# Patient Record
Sex: MALE | Race: WHITE | Employment: FULL TIME | URBAN - METROPOLITAN AREA
[De-identification: names, ages, dates, MRNs, and addresses within clinical notes are randomized per-mention and may not be internally consistent; named-entity substitution may affect disease eponyms.]

---

## 2017-03-02 ENCOUNTER — OFFICE VISIT (OUTPATIENT)
Dept: ORTHOPEDIC SURGERY | Age: 52
End: 2017-03-02

## 2017-03-02 VITALS
WEIGHT: 248 LBS | HEIGHT: 69 IN | SYSTOLIC BLOOD PRESSURE: 136 MMHG | DIASTOLIC BLOOD PRESSURE: 87 MMHG | HEART RATE: 88 BPM | BODY MASS INDEX: 36.73 KG/M2

## 2017-03-02 DIAGNOSIS — M19.011 ACROMIOCLAVICULAR ARTHROSIS, BILATERAL: ICD-10-CM

## 2017-03-02 DIAGNOSIS — M75.102 ROTATOR CUFF SYNDROME, LEFT: Primary | ICD-10-CM

## 2017-03-02 DIAGNOSIS — M19.012 ACROMIOCLAVICULAR ARTHROSIS, BILATERAL: ICD-10-CM

## 2017-03-02 DIAGNOSIS — M75.22 BICEPS TENDINITIS OF BOTH SHOULDERS: ICD-10-CM

## 2017-03-02 DIAGNOSIS — M75.21 BICEPS TENDINITIS OF BOTH SHOULDERS: ICD-10-CM

## 2017-03-02 PROCEDURE — 20610 DRAIN/INJ JOINT/BURSA W/O US: CPT | Performed by: ORTHOPAEDIC SURGERY

## 2017-03-02 PROCEDURE — 73030 X-RAY EXAM OF SHOULDER: CPT | Performed by: ORTHOPAEDIC SURGERY

## 2017-03-02 PROCEDURE — 99243 OFF/OP CNSLTJ NEW/EST LOW 30: CPT | Performed by: ORTHOPAEDIC SURGERY

## 2017-03-02 RX ORDER — HYDROCODONE BITARTRATE AND ACETAMINOPHEN 5; 325 MG/1; MG/1
TABLET ORAL
Refills: 0 | COMMUNITY
Start: 2017-02-01 | End: 2018-12-20 | Stop reason: ALTCHOICE

## 2017-03-02 ASSESSMENT — ENCOUNTER SYMPTOMS: BACK PAIN: 1

## 2017-10-11 ENCOUNTER — TELEPHONE (OUTPATIENT)
Dept: FAMILY MEDICINE CLINIC | Age: 52
End: 2017-10-11

## 2017-10-11 DIAGNOSIS — Z12.5 SCREENING PSA (PROSTATE SPECIFIC ANTIGEN): ICD-10-CM

## 2017-10-11 DIAGNOSIS — Z00.00 ROUTINE GENERAL MEDICAL EXAMINATION AT A HEALTH CARE FACILITY: ICD-10-CM

## 2017-10-11 DIAGNOSIS — Z11.4 ENCOUNTER FOR SCREENING FOR HIV: ICD-10-CM

## 2017-10-11 DIAGNOSIS — Z13.220 SCREENING, LIPID: Primary | ICD-10-CM

## 2017-10-11 NOTE — TELEPHONE ENCOUNTER
Jaxson Schooling -wife (currently your patient) would like to know if you would accept pt as a NP?   Pl advise  [phone]

## 2017-10-24 NOTE — TELEPHONE ENCOUNTER
Labs ordered, [facility] aware 12 hr fast also to have done at least 3 days prior to appt. Scheduled 11/13.

## 2017-11-28 ENCOUNTER — TELEPHONE (OUTPATIENT)
Dept: FAMILY MEDICINE CLINIC | Age: 52
End: 2017-11-28

## 2017-11-28 NOTE — TELEPHONE ENCOUNTER
Pt was scheduled as a NP on Nov. 30, and he needs to cancel b/c he will be out of town. Pt is wanting to reschedule for sometime in Jan. And would like to know if that would be ok?   Pl advise Aldo Ennis wife @ [phone]

## 2018-02-27 DIAGNOSIS — Z13.220 SCREENING, LIPID: ICD-10-CM

## 2018-02-27 DIAGNOSIS — Z11.4 ENCOUNTER FOR SCREENING FOR HIV: ICD-10-CM

## 2018-02-27 DIAGNOSIS — Z00.00 ROUTINE GENERAL MEDICAL EXAMINATION AT A HEALTH CARE FACILITY: ICD-10-CM

## 2018-02-27 DIAGNOSIS — Z12.5 SCREENING PSA (PROSTATE SPECIFIC ANTIGEN): ICD-10-CM

## 2018-02-27 LAB
A/G RATIO: 1.9 (ref 1.1–2.2)
ALBUMIN SERPL-MCNC: 4.6 G/DL (ref 3.4–5)
ALP BLD-CCNC: 126 U/L (ref 40–129)
ALT SERPL-CCNC: 20 U/L (ref 10–40)
ANION GAP SERPL CALCULATED.3IONS-SCNC: 10 MMOL/L (ref 3–16)
AST SERPL-CCNC: 17 U/L (ref 15–37)
BASOPHILS ABSOLUTE: 0 K/UL (ref 0–0.2)
BASOPHILS RELATIVE PERCENT: 0.5 %
BILIRUB SERPL-MCNC: 0.3 MG/DL (ref 0–1)
BUN BLDV-MCNC: 12 MG/DL (ref 7–20)
CALCIUM SERPL-MCNC: 9.3 MG/DL (ref 8.3–10.6)
CHLORIDE BLD-SCNC: 102 MMOL/L (ref 99–110)
CHOLESTEROL, TOTAL: 194 MG/DL (ref 0–199)
CO2: 29 MMOL/L (ref 21–32)
CREAT SERPL-MCNC: 0.8 MG/DL (ref 0.9–1.3)
EOSINOPHILS ABSOLUTE: 0.5 K/UL (ref 0–0.6)
EOSINOPHILS RELATIVE PERCENT: 5.2 %
GFR AFRICAN AMERICAN: >60
GFR NON-AFRICAN AMERICAN: >60
GLOBULIN: 2.4 G/DL
GLUCOSE BLD-MCNC: 100 MG/DL (ref 70–99)
HCT VFR BLD CALC: 45.4 % (ref 40.5–52.5)
HDLC SERPL-MCNC: 31 MG/DL (ref 40–60)
HEMOGLOBIN: 15.7 G/DL (ref 13.5–17.5)
LDL CHOLESTEROL CALCULATED: 130 MG/DL
LYMPHOCYTES ABSOLUTE: 2.6 K/UL (ref 1–5.1)
LYMPHOCYTES RELATIVE PERCENT: 28.8 %
MCH RBC QN AUTO: 30.6 PG (ref 26–34)
MCHC RBC AUTO-ENTMCNC: 34.6 G/DL (ref 31–36)
MCV RBC AUTO: 88.3 FL (ref 80–100)
MONOCYTES ABSOLUTE: 0.7 K/UL (ref 0–1.3)
MONOCYTES RELATIVE PERCENT: 7.3 %
NEUTROPHILS ABSOLUTE: 5.3 K/UL (ref 1.7–7.7)
NEUTROPHILS RELATIVE PERCENT: 58.2 %
PDW BLD-RTO: 14.1 % (ref 12.4–15.4)
PLATELET # BLD: 308 K/UL (ref 135–450)
PMV BLD AUTO: 7.5 FL (ref 5–10.5)
POTASSIUM SERPL-SCNC: 4.7 MMOL/L (ref 3.5–5.1)
PROSTATE SPECIFIC ANTIGEN: 0.6 NG/ML (ref 0–4)
RBC # BLD: 5.14 M/UL (ref 4.2–5.9)
SODIUM BLD-SCNC: 141 MMOL/L (ref 136–145)
TOTAL PROTEIN: 7 G/DL (ref 6.4–8.2)
TRIGL SERPL-MCNC: 166 MG/DL (ref 0–150)
VLDLC SERPL CALC-MCNC: 33 MG/DL
WBC # BLD: 9 K/UL (ref 4–11)

## 2018-02-28 LAB
HIV AG/AB: NORMAL
HIV ANTIGEN: NORMAL
HIV-1 ANTIBODY: NORMAL
HIV-2 AB: NORMAL

## 2018-12-10 ENCOUNTER — TELEPHONE (OUTPATIENT)
Dept: FAMILY MEDICINE CLINIC | Age: 53
End: 2018-12-10

## 2018-12-20 ENCOUNTER — HOSPITAL ENCOUNTER (OUTPATIENT)
Dept: CT IMAGING | Age: 53
Discharge: HOME | End: 2018-12-20
Payer: COMMERCIAL

## 2018-12-20 ENCOUNTER — OFFICE VISIT (OUTPATIENT)
Dept: FAMILY MEDICINE CLINIC | Age: 53
End: 2018-12-20
Payer: COMMERCIAL

## 2018-12-20 ENCOUNTER — HOSPITAL ENCOUNTER (OUTPATIENT)
Age: 53
Discharge: HOME | End: 2018-12-20
Payer: COMMERCIAL

## 2018-12-20 VITALS
DIASTOLIC BLOOD PRESSURE: 84 MMHG | TEMPERATURE: 97.9 F | WEIGHT: 260 LBS | BODY MASS INDEX: 38.51 KG/M2 | HEIGHT: 69 IN | SYSTOLIC BLOOD PRESSURE: 122 MMHG

## 2018-12-20 DIAGNOSIS — Z72.0 NICOTINE ABUSE: ICD-10-CM

## 2018-12-20 DIAGNOSIS — Z87.19 HISTORY OF PANCREATITIS: ICD-10-CM

## 2018-12-20 DIAGNOSIS — Z00.00 PREVENTATIVE HEALTH CARE: Primary | ICD-10-CM

## 2018-12-20 DIAGNOSIS — R14.0 ABDOMINAL BLOATING: ICD-10-CM

## 2018-12-20 DIAGNOSIS — Z12.5 PROSTATE CANCER SCREENING: ICD-10-CM

## 2018-12-20 DIAGNOSIS — R35.0 URINARY FREQUENCY: ICD-10-CM

## 2018-12-20 DIAGNOSIS — G89.29 CHRONIC RIGHT-SIDED LOW BACK PAIN WITH RIGHT-SIDED SCIATICA: ICD-10-CM

## 2018-12-20 DIAGNOSIS — M54.41 CHRONIC RIGHT-SIDED LOW BACK PAIN WITH RIGHT-SIDED SCIATICA: ICD-10-CM

## 2018-12-20 LAB
A/G RATIO: 1.9 (ref 1.1–2.2)
ALBUMIN SERPL-MCNC: 4.7 G/DL (ref 3.4–5)
ALP BLD-CCNC: 99 U/L (ref 40–129)
ALT SERPL-CCNC: 24 U/L (ref 10–40)
AMYLASE: 65 U/L (ref 25–115)
ANION GAP SERPL CALCULATED.3IONS-SCNC: 13 MMOL/L (ref 3–16)
AST SERPL-CCNC: 25 U/L (ref 15–37)
BASOPHILS ABSOLUTE: 0.1 K/UL (ref 0–0.2)
BASOPHILS RELATIVE PERCENT: 0.6 %
BILIRUB SERPL-MCNC: <0.2 MG/DL (ref 0–1)
BILIRUBIN URINE: NEGATIVE
BLOOD, URINE: NEGATIVE
BUN BLDV-MCNC: 12 MG/DL (ref 7–20)
CALCIUM SERPL-MCNC: 9.4 MG/DL (ref 8.3–10.6)
CHLORIDE BLD-SCNC: 104 MMOL/L (ref 99–110)
CLARITY: CLEAR
CO2: 26 MMOL/L (ref 21–32)
COLOR: YELLOW
CREAT SERPL-MCNC: 0.8 MG/DL (ref 0.9–1.3)
EOSINOPHILS ABSOLUTE: 0.4 K/UL (ref 0–0.6)
EOSINOPHILS RELATIVE PERCENT: 3.8 %
GFR AFRICAN AMERICAN: >60
GFR NON-AFRICAN AMERICAN: >60
GLOBULIN: 2.5 G/DL
GLUCOSE BLD-MCNC: 98 MG/DL (ref 70–99)
GLUCOSE URINE: NEGATIVE MG/DL
HCT VFR BLD CALC: 46.2 % (ref 40.5–52.5)
HEMOGLOBIN: 15.8 G/DL (ref 13.5–17.5)
KETONES, URINE: NEGATIVE MG/DL
LEUKOCYTE ESTERASE, URINE: NEGATIVE
LIPASE: 62 U/L (ref 13–60)
LYMPHOCYTES ABSOLUTE: 4 K/UL (ref 1–5.1)
LYMPHOCYTES RELATIVE PERCENT: 41.8 %
MCH RBC QN AUTO: 30.5 PG (ref 26–34)
MCHC RBC AUTO-ENTMCNC: 34.2 G/DL (ref 31–36)
MCV RBC AUTO: 89.3 FL (ref 80–100)
MICROSCOPIC EXAMINATION: NORMAL
MONOCYTES ABSOLUTE: 0.8 K/UL (ref 0–1.3)
MONOCYTES RELATIVE PERCENT: 8.3 %
NEUTROPHILS ABSOLUTE: 4.3 K/UL (ref 1.7–7.7)
NEUTROPHILS RELATIVE PERCENT: 45.5 %
NITRITE, URINE: NEGATIVE
PDW BLD-RTO: 14 % (ref 12.4–15.4)
PH UA: 6
PLATELET # BLD: 309 K/UL (ref 135–450)
PMV BLD AUTO: 8.2 FL (ref 5–10.5)
POTASSIUM SERPL-SCNC: 4 MMOL/L (ref 3.5–5.1)
PROSTATE SPECIFIC ANTIGEN: 0.72 NG/ML (ref 0–4)
PROTEIN UA: NEGATIVE MG/DL
RBC # BLD: 5.18 M/UL (ref 4.2–5.9)
SODIUM BLD-SCNC: 143 MMOL/L (ref 136–145)
SPECIFIC GRAVITY UA: 1.03
TOTAL PROTEIN: 7.2 G/DL (ref 6.4–8.2)
TSH REFLEX: 2.37 UIU/ML (ref 0.27–4.2)
URINE TYPE: NORMAL
UROBILINOGEN, URINE: 0.2 E.U./DL
WBC # BLD: 9.5 K/UL (ref 4–11)

## 2018-12-20 PROCEDURE — 74177 CT ABD & PELVIS W/CONTRAST: CPT

## 2018-12-20 PROCEDURE — 6360000004 HC RX CONTRAST MEDICATION: Performed by: FAMILY MEDICINE

## 2018-12-20 PROCEDURE — 99386 PREV VISIT NEW AGE 40-64: CPT | Performed by: FAMILY MEDICINE

## 2018-12-20 RX ADMIN — IOVERSOL 100 ML: 678 INJECTION INTRA-ARTERIAL; INTRAVENOUS at 16:08

## 2018-12-20 RX ADMIN — IOHEXOL 50 ML: 240 INJECTION, SOLUTION INTRATHECAL; INTRAVASCULAR; INTRAVENOUS; ORAL at 14:50

## 2018-12-20 ASSESSMENT — PATIENT HEALTH QUESTIONNAIRE - PHQ9
SUM OF ALL RESPONSES TO PHQ QUESTIONS 1-9: 0
1. LITTLE INTEREST OR PLEASURE IN DOING THINGS: 0
2. FEELING DOWN, DEPRESSED OR HOPELESS: 0
SUM OF ALL RESPONSES TO PHQ QUESTIONS 1-9: 0
SUM OF ALL RESPONSES TO PHQ9 QUESTIONS 1 & 2: 0

## 2018-12-21 ENCOUNTER — TELEPHONE (OUTPATIENT)
Dept: FAMILY MEDICINE CLINIC | Age: 53
End: 2018-12-21

## 2018-12-21 DIAGNOSIS — M54.50 LUMBAR PAIN WITH RADIATION DOWN RIGHT LEG: Primary | ICD-10-CM

## 2018-12-21 DIAGNOSIS — M79.604 LUMBAR PAIN WITH RADIATION DOWN RIGHT LEG: Primary | ICD-10-CM

## 2018-12-21 LAB
ESTIMATED AVERAGE GLUCOSE: 137 MG/DL
HBA1C MFR BLD: 6.4 %

## 2018-12-21 NOTE — TELEPHONE ENCOUNTER
Call patient  His labs are all good except that his lipase is a tiny bit elevated. This is one of the pancreas enzymes. Normal range is up to 60 and his is at 62. I have ordered x-rays for his spine. Please see if he can have these done over the weekend and then he can call Monday for those results.  Thanks

## 2018-12-22 ENCOUNTER — HOSPITAL ENCOUNTER (OUTPATIENT)
Age: 53
Discharge: HOME | End: 2018-12-22
Payer: COMMERCIAL

## 2018-12-22 ENCOUNTER — HOSPITAL ENCOUNTER (OUTPATIENT)
Dept: GENERAL RADIOLOGY | Age: 53
Discharge: HOME | End: 2018-12-22
Payer: COMMERCIAL

## 2018-12-22 DIAGNOSIS — M54.50 LUMBAR PAIN WITH RADIATION DOWN RIGHT LEG: ICD-10-CM

## 2018-12-22 DIAGNOSIS — M79.604 LUMBAR PAIN WITH RADIATION DOWN RIGHT LEG: ICD-10-CM

## 2018-12-22 PROCEDURE — 72100 X-RAY EXAM L-S SPINE 2/3 VWS: CPT

## 2018-12-24 DIAGNOSIS — M53.86 SCIATICA OF RIGHT SIDE ASSOCIATED WITH DISORDER OF LUMBAR SPINE: Primary | ICD-10-CM

## 2019-01-05 ENCOUNTER — HOSPITAL ENCOUNTER (OUTPATIENT)
Dept: MRI IMAGING | Age: 54
Discharge: HOME | End: 2019-01-05
Payer: COMMERCIAL

## 2019-01-05 DIAGNOSIS — M53.86 SCIATICA OF RIGHT SIDE ASSOCIATED WITH DISORDER OF LUMBAR SPINE: ICD-10-CM

## 2019-01-05 PROCEDURE — 72158 MRI LUMBAR SPINE W/O & W/DYE: CPT

## 2019-01-05 PROCEDURE — A9579 GAD-BASE MR CONTRAST NOS,1ML: HCPCS | Performed by: FAMILY MEDICINE

## 2019-01-05 PROCEDURE — 6360000004 HC RX CONTRAST MEDICATION: Performed by: FAMILY MEDICINE

## 2019-01-05 RX ADMIN — GADOTERIDOL 20 ML: 279.3 INJECTION, SOLUTION INTRAVENOUS at 10:54

## 2019-01-07 ENCOUNTER — TELEPHONE (OUTPATIENT)
Dept: ORTHOPEDIC SURGERY | Age: 54
End: 2019-01-07

## 2019-01-07 ENCOUNTER — TELEPHONE (OUTPATIENT)
Dept: FAMILY MEDICINE CLINIC | Age: 54
End: 2019-01-07

## 2019-01-07 DIAGNOSIS — R93.89 ABNORMAL X-RAY: Primary | ICD-10-CM

## 2019-01-14 ENCOUNTER — OFFICE VISIT (OUTPATIENT)
Dept: ORTHOPEDIC SURGERY | Age: 54
End: 2019-01-14
Payer: COMMERCIAL

## 2019-01-14 VITALS
HEIGHT: 69 IN | SYSTOLIC BLOOD PRESSURE: 139 MMHG | BODY MASS INDEX: 38.66 KG/M2 | HEART RATE: 96 BPM | WEIGHT: 261 LBS | DIASTOLIC BLOOD PRESSURE: 89 MMHG

## 2019-01-14 DIAGNOSIS — M47.816 LUMBAR FACET ARTHROPATHY: Primary | ICD-10-CM

## 2019-01-14 PROCEDURE — 99243 OFF/OP CNSLTJ NEW/EST LOW 30: CPT | Performed by: NURSE PRACTITIONER

## 2019-01-14 RX ORDER — METHYLPREDNISOLONE 4 MG/1
TABLET ORAL
Qty: 1 KIT | Refills: 0 | Status: SHIPPED | OUTPATIENT
Start: 2019-01-14 | End: 2019-01-20

## 2019-01-14 RX ORDER — IBUPROFEN 200 MG
200 TABLET ORAL EVERY 6 HOURS PRN
COMMUNITY
End: 2019-07-02

## 2019-01-14 RX ORDER — CYCLOBENZAPRINE HCL 10 MG
10 TABLET ORAL 2 TIMES DAILY PRN
Qty: 30 TABLET | Refills: 0 | Status: SHIPPED | OUTPATIENT
Start: 2019-01-14 | End: 2019-01-24

## 2019-01-14 RX ORDER — ACETAMINOPHEN 325 MG/1
650 TABLET ORAL EVERY 6 HOURS PRN
COMMUNITY
End: 2019-07-02

## 2019-03-01 ASSESSMENT — ENCOUNTER SYMPTOMS
SHORTNESS OF BREATH: 0
BACK PAIN: 1

## 2019-05-20 ENCOUNTER — HOSPITAL ENCOUNTER (EMERGENCY)
Age: 54
Discharge: HOME | End: 2019-05-20
Payer: COMMERCIAL

## 2019-05-20 ENCOUNTER — APPOINTMENT (OUTPATIENT)
Dept: CT IMAGING | Age: 54
End: 2019-05-20
Payer: COMMERCIAL

## 2019-05-20 VITALS
TEMPERATURE: 98 F | BODY MASS INDEX: 36.64 KG/M2 | WEIGHT: 247.36 LBS | RESPIRATION RATE: 16 BRPM | OXYGEN SATURATION: 98 % | HEIGHT: 69 IN | HEART RATE: 76 BPM | DIASTOLIC BLOOD PRESSURE: 83 MMHG | SYSTOLIC BLOOD PRESSURE: 147 MMHG

## 2019-05-20 DIAGNOSIS — F07.81 POST CONCUSSIVE SYNDROME: ICD-10-CM

## 2019-05-20 DIAGNOSIS — S09.90XA CLOSED HEAD INJURY, INITIAL ENCOUNTER: Primary | ICD-10-CM

## 2019-05-20 PROCEDURE — 70450 CT HEAD/BRAIN W/O DYE: CPT

## 2019-05-20 PROCEDURE — 6370000000 HC RX 637 (ALT 250 FOR IP): Performed by: PHYSICIAN ASSISTANT

## 2019-05-20 PROCEDURE — 99284 EMERGENCY DEPT VISIT MOD MDM: CPT

## 2019-05-20 RX ORDER — ONDANSETRON 4 MG/1
4 TABLET, ORALLY DISINTEGRATING ORAL ONCE
Status: COMPLETED | OUTPATIENT
Start: 2019-05-20 | End: 2019-05-20

## 2019-05-20 RX ADMIN — ONDANSETRON 4 MG: 4 TABLET, ORALLY DISINTEGRATING ORAL at 02:20

## 2019-05-20 ASSESSMENT — ENCOUNTER SYMPTOMS
NAUSEA: 0
VOMITING: 0
CHEST TIGHTNESS: 0
COLOR CHANGE: 0
SHORTNESS OF BREATH: 0

## 2019-05-20 ASSESSMENT — PAIN DESCRIPTION - FREQUENCY: FREQUENCY: CONTINUOUS

## 2019-05-20 ASSESSMENT — PAIN - FUNCTIONAL ASSESSMENT: PAIN_FUNCTIONAL_ASSESSMENT: ACTIVITIES ARE NOT PREVENTED

## 2019-05-20 ASSESSMENT — PAIN DESCRIPTION - PAIN TYPE: TYPE: ACUTE PAIN

## 2019-05-20 ASSESSMENT — PAIN SCALES - GENERAL
PAINLEVEL_OUTOF10: 0
PAINLEVEL_OUTOF10: 2

## 2019-05-20 ASSESSMENT — PAIN DESCRIPTION - DESCRIPTORS: DESCRIPTORS: OTHER (COMMENT)

## 2019-05-20 ASSESSMENT — PAIN DESCRIPTION - PROGRESSION
CLINICAL_PROGRESSION: RESOLVED
CLINICAL_PROGRESSION: NOT CHANGED

## 2019-05-20 ASSESSMENT — PAIN DESCRIPTION - LOCATION: LOCATION: ARM

## 2019-05-20 ASSESSMENT — PAIN DESCRIPTION - ORIENTATION: ORIENTATION: RIGHT

## 2019-05-20 ASSESSMENT — PAIN DESCRIPTION - ONSET: ONSET: ON-GOING

## 2019-05-20 NOTE — ED NOTES
Pt ambulated to ER bed #17 without difficulty and a steady gait.       Ismael Vaz RN  05/20/19 0158       Ismael Vaz RN  05/20/19 2060

## 2019-05-20 NOTE — ED NOTES
RN at bedside updating pt and wife on discharge plan of care. Pt verbalized understanding.       Tomeka Apple, ELI  05/20/19 2767

## 2019-05-20 NOTE — ED PROVIDER NOTES
[facility]        Pt Name: Maria Luisa Stone  MRN: [id number]  [place] 1965  Date of evaluation: 5/20/2019  Provider: Oscar Huizar PA-C  PCP: Gerald Miller, DO    This patient was not seen and evaluated by the attending physician       [address] [place] [address]       Chief Complaint   Patient presents with    Fall     fell down a hill when going to retrieve a volleyball, reports LOC, unknown how long. Wife reports patient laid down to go to bed, wife woke patient up and he was unable to recall how many children he had, where he worked, and slow to respond.  Head Injury    Arm Injury     tok out a tree with the left arm. HISTORY OF PRESENT ILLNESS   (Location/Symptom, Timing/Onset, Context/Setting, Quality, Duration, Modifying Factors, Severity)  Note limiting factors. Maria Luisa Stone is a 48 y.o. male presents to emergency Department by private vehicle with his wife. Patient was playing volleyball outside with family around 8:00 this evening. Volleyball went down a hill the patient which are treated the ball. Patient slipped while going down the hill. He hit his head and lost consciousness. Wife states patient came back up to about 10 minutes later, unsure how long he was unconscious for. Patient sustained abrasions to his right arm and back. No other injuries are noted and patient did not seek evaluation hospital.  Patient woke up and  I appear confused. He was unable to recall, he children he had initially, where he was and was slow to respond. Wife brought him in for evaluation at that time. Patient denies any neck pain, back pain, extremity injury. Patient member is going down to retrieve the ball and walking back up the hill. He has pain rated 2/10 to right arm where abrasions were sustained. Denies any pain with movement of arm.   Fall was mechanical and not secondary to chest pain, shortness breath, dizziness or syncope. Nursing Notes were all reviewed and agreed with or any disagreements were addressed  in the HPI. REVIEW OF SYSTEMS    (2-9 systems for level 4, 10 or more for level 5)     Review of Systems   Constitutional: Negative for chills and fever. HENT: Negative. Respiratory: Negative for chest tightness and shortness of breath. Gastrointestinal: Negative for nausea and vomiting. Genitourinary: Negative. Musculoskeletal: Negative for arthralgias and myalgias. Skin: Negative for color change, pallor, rash and wound. Neurological: Negative for dizziness, light-headedness and headaches. Psychiatric/Behavioral: Positive for confusion. Negative for agitation. Positives and Pertinent negatives as per HPI. Except as noted abovein the ROS, all other systems were reviewed and negative.        PAST MEDICAL HISTORY     Past Medical History:   Diagnosis Date    Anxiety     High blood pressure          SURGICAL HISTORY     Past Surgical History:   Procedure Laterality Date    CHOLECYSTECTOMY      DENTAL SURGERY  all of his teeth removed    GALLBLADDER SURGERY           CURRENTMEDICATIONS       Previous Medications    ACETAMINOPHEN (TYLENOL) 325 MG TABLET    Take 650 mg by mouth every 6 hours as needed for Pain    IBUPROFEN (ADVIL;MOTRIN) 200 MG TABLET    Take 200 mg by mouth every 6 hours as needed for Pain    PSEUDOEPH-DOXYLAMINE-DM-APAP (NYQUIL PO)    Take by mouth         ALLERGIES     Tramadol    FAMILYHISTORY       Family History   Problem Relation Age of Onset    Diabetes Mother     Cancer Mother         kidney    Heart Attack Mother     High Blood Pressure Mother     Asthma Father     High Blood Pressure Other     Heart Disease Other     Arthritis Other     Alcohol Abuse Brother     Heart Attack Maternal Uncle     Hearing Loss Maternal Grandmother     Heart Disease Maternal Grandmother     Stroke Maternal Grandmother           SOCIAL HISTORY       Social History     Socioeconomic History    Marital status:      Spouse name: None    Number of children: None    Years of education: None    Highest education level: None   Occupational History    Occupation: Dispatcher   Social Needs    Financial resource strain: None    Food insecurity:     Worry: None     Inability: None    Transportation needs:     Medical: None     Non-medical: None   Tobacco Use    Smoking status: Current Every Day Smoker     Packs/day: 2.00     Years: 25.00     Pack years: 50.00     Types: Cigarettes    Smokeless tobacco: Never Used   Substance and Sexual Activity    Alcohol use: No     Comment: occasionally     Drug use: No    Sexual activity: Yes     Partners: Female   Lifestyle    Physical activity:     Days per week: None     Minutes per session: None    Stress: None   Relationships    Social connections:     Talks on phone: None     Gets together: None     Attends Hinduism service: None     Active member of club or organization: None     Attends meetings of clubs or organizations: None     Relationship status: None    Intimate partner violence:     Fear of current or ex partner: None     Emotionally abused: None     Physically abused: None     Forced sexual activity: None   Other Topics Concern    None   Social History Narrative    None       SCREENINGS             PHYSICAL EXAM    (up to 7 for level 4, 8 or more for level 5)     ED Triage Vitals   BP Temp Temp Source Pulse Resp SpO2 Height Weight   05/20/19 0155 05/20/19 0155 05/20/19 0155 05/20/19 0155 05/20/19 0155 05/20/19 0155 05/20/19 0202 05/20/19 0155   (!) 183/102 98.2 °F (36.8 °C) Oral 102 16 98 % 5' 9\" (1.753 m) 247 lb 5.7 oz (112.2 kg)       Physical Exam   Constitutional: He is oriented to person, place, and time. He appears well-developed and well-nourished. HENT:   Head: Normocephalic and atraumatic. Eyes: Conjunctivae and EOM are normal.   Neck: Normal range of motion. Neck supple.    No midline tenderness   Cardiovascular: Normal rate and regular rhythm. Pulmonary/Chest: Effort normal and breath sounds normal. No stridor. No respiratory distress. He has no wheezes. Abdominal: Soft. There is no tenderness. Musculoskeletal:   No midline spinous process tenderness noted throughout. Full flexion/extension of upper extremities, no focal tenderness, no obvious deformity, ambulated in ED without assistance or difficulty, full ROM bilateral lower extremities   Neurological: He is alert and oriented to person, place, and time. No cranial nerve deficit. Coordination normal.   Slowed to respond to date and last name but is able to recall   Skin: Skin is warm. He is not diaphoretic. No erythema. Psychiatric: He has a normal mood and affect. His behavior is normal.   Vitals reviewed. DIAGNOSTIC RESULTS   LABS:    Labs Reviewed - No data to display    All other labs were within normal range or not returned as of this dictation. EKG: All EKG's are interpreted by the Emergency Department Physician who either signs orCo-signs this chart in the absence of a cardiologist.  Please see their note for interpretation of EKG. RADIOLOGY:   Non-plain film images such as CT, Ultrasound and MRI are read by the radiologist. Plain radiographic images are visualized andpreliminarily interpreted by the  ED Provider with the below findings:        Interpretation perthe Radiologist below, if available at the time of this note:    CT HEAD WO CONTRAST   Final Result   No acute intracranial abnormality. Ct Head Wo Contrast    Result Date: 5/20/2019  EXAMINATION: CT OF THE HEAD WITHOUT CONTRAST  5/20/2019 2:03 am TECHNIQUE: CT of the head was performed without the administration of intravenous contrast. Dose modulation, iterative reconstruction, and/or weight based adjustment of the mA/kV was utilized to reduce the radiation dose to as low as reasonably achievable. COMPARISON: None.  HISTORY: ORDERING SYSTEM PROVIDED HISTORY: head injury earlier today, + confusion TECHNOLOGIST PROVIDED HISTORY: Has a \"code stroke\" or \"stroke alert\" been called? ->No Ordering Physician Provided Reason for Exam: Fall (fell down a hill when going to retrieve a volleyball, reports LOC, unknown how long. Wife reports patient laid down to go to bed, wife woke patient up and he was unable to recall how many children he had, where he worked, and slow to respond. ) FINDINGS: BRAIN/VENTRICLES: There is no acute intracranial hemorrhage, mass effect or midline shift. No abnormal extra-axial fluid collection. The gray-white differentiation is maintained without evidence of an acute infarct. There is no evidence of hydrocephalus. ORBITS: The visualized portion of the orbits demonstrate no acute abnormality. SINUSES: The visualized paranasal sinuses and mastoid air cells demonstrate no acute abnormality. SOFT TISSUES/SKULL:  No acute abnormality of the visualized skull or soft tissues. No acute intracranial abnormality. PROCEDURES   Unless otherwise noted below, none     Procedures    CRITICAL CARE TIME   N/A    CONSULTS:  None      EMERGENCY DEPARTMENT COURSE and DIFFERENTIALDIAGNOSIS/MDM:   Vitals:    Vitals:    05/20/19 0202 05/20/19 0208 05/20/19 0244 05/20/19 0255   BP:  (!) 165/86 (!) 144/82 (!) 147/83   Pulse:   81 76   Resp:   16    Temp:   98 °F (36.7 °C)    TempSrc:   Oral    SpO2:  92% 98%    Weight:       Height: 5' 9\" (1.753 m)          Patient was given thefollowing medications:  Medications   ondansetron (ZOFRAN-ODT) disintegrating tablet 4 mg (4 mg Oral Given 5/20/19 0220)       Patient presents ED with HPI noted above. He is afebrile and nontoxic-appearing. Oxygen saturation initially noted to be 92% on room air. Patient denies any chest pain, short of breath, oxygen saturation 98% on reevaluation. Patient with mild confusion and slowed responses, but able to answer questions appropriately and accurately daily. Otherwise normal neurological exam.  CT head without contrast obtained and showed no acute intracranial abnormality. Clinically patient has concussion, educated on postconcussive syndrome. CT was obtained 6 hours after initial injury, do not believe repeat imaging or further evaluation indicated this time. He is not on any anticoagulants. She had no midline spinous process tenderness noted throughout, full AROM of all 4 extremities, no focal tenderness. No additional imaging indicated this time. Fall was mechanical.  No additional workup indicated. Patient discharged home in stable condition. Patient given strict return precautions. Educated on postconcussive syndrome and provided note for work. Patient follow up with PCP in 2-3 days for reevaluation. The patient tolerated their visit well. I have discussed the findings of today's workup with the patient and addressed the patient's questions and concerns. Important warning signs as well as new or worsening symptoms which would necessitate immediate return to the ED were discussed. The plan is to discharge from the ED at this time, and the patient is in stable condition. The patient acknowledged understanding is agreeable with this plan. FINAL IMPRESSION      1. Closed head injury, initial encounter    2. Post concussive syndrome          DISPOSITION/PLAN   DISPOSITION Decision To Discharge 05/20/2019 02:42:44 AM      PATIENT REFERREDTO:  [facility] Emergency Department  [id number]  [phone]  Go to   If symptoms worsen    FRIDA [address] [facility]  [phone]    Schedule an appointment as soon as possible for a visit in 2 days  For follow up and reevaluation.       DISCHARGE MEDICATIONS:  New Prescriptions    No medications on file       DISCONTINUED MEDICATIONS:  Discontinued Medications    No medications on file              (Please note that portions

## 2019-05-20 NOTE — ED NOTES
Patient was outside playing volleyball with family around 8 pm. Aleah Mattson went over hill, patient went to retrieve ball, his wife went inside to take their grandson to the bathroom. She reports being away for ~ 10 minutes. As she was coming back patient was coming back up the steep hill stating he slipped on the mud and fell down the hill and knocked himself out. Reports when he come to the trees were spinning. Patient and wife went inside and went to sleep. Wife woke patient up a few hours later to check on him and patient was slow to respond. Unable to recall his last name, where he worked, or how many children he has. After about 45 minutes patient was able to to recall his name and children's name, and his address. Patient's wife helped him get dressed to come to ER. Upon arrival patient ambulated back to room. He was able to recall his first name, slow to recall his last name or . Seems to stare off in space at times. Slow to respond with year at first stating he wasn't sure, but then a few minutes later recalling 2019. Patient denies cervical tenderness. Has abrasion to right upper inner arm, and 3 abrasions to his back. No active bleeding for abrasions. Taylor KIRBY at bedside for evaluation.       Estrellita Dandy, RN  [phone]

## 2019-07-02 ENCOUNTER — OFFICE VISIT (OUTPATIENT)
Dept: FAMILY MEDICINE CLINIC | Age: 54
End: 2019-07-02
Payer: COMMERCIAL

## 2019-07-02 VITALS
SYSTOLIC BLOOD PRESSURE: 128 MMHG | TEMPERATURE: 98.3 F | DIASTOLIC BLOOD PRESSURE: 82 MMHG | WEIGHT: 247 LBS | HEIGHT: 69 IN | BODY MASS INDEX: 36.58 KG/M2

## 2019-07-02 DIAGNOSIS — J40 BRONCHITIS: Primary | ICD-10-CM

## 2019-07-02 PROCEDURE — 99213 OFFICE O/P EST LOW 20 MIN: CPT | Performed by: INTERNAL MEDICINE

## 2019-07-02 RX ORDER — CEFUROXIME AXETIL 250 MG/1
250 TABLET ORAL 2 TIMES DAILY
Qty: 20 TABLET | Refills: 0 | Status: SHIPPED | OUTPATIENT
Start: 2019-07-02 | End: 2019-07-12

## 2019-07-02 RX ORDER — ALBUTEROL SULFATE 90 UG/1
2 AEROSOL, METERED RESPIRATORY (INHALATION) EVERY 6 HOURS PRN
Qty: 1 INHALER | Refills: 3 | Status: SHIPPED | OUTPATIENT
Start: 2019-07-02 | End: 2019-12-03

## 2019-07-02 ASSESSMENT — PATIENT HEALTH QUESTIONNAIRE - PHQ9
2. FEELING DOWN, DEPRESSED OR HOPELESS: 0
SUM OF ALL RESPONSES TO PHQ QUESTIONS 1-9: 0
SUM OF ALL RESPONSES TO PHQ QUESTIONS 1-9: 0
SUM OF ALL RESPONSES TO PHQ9 QUESTIONS 1 & 2: 0
1. LITTLE INTEREST OR PLEASURE IN DOING THINGS: 0

## 2019-07-02 ASSESSMENT — ENCOUNTER SYMPTOMS
ABDOMINAL PAIN: 0
SHORTNESS OF BREATH: 0
RHINORRHEA: 0
COUGH: 1
CONSTIPATION: 0
APNEA: 0

## 2019-07-02 NOTE — PATIENT INSTRUCTIONS
Thank you for choosing [facility]. Please bring a current list of medications to every appointment. Please contact your pharmacy for any prescription refill(s) that you are requesting. Cigarette Smoking and Its Health Risks   GENERAL INFORMATION:   Smoking and your health: Cigarette smoking is the most preventable cause of illness and death in the [place]. A large number of Americans smoke cigarettes, and each year more than one million children and adults start smoking cigarettes. Many people die every year from illnesses caused by smoking. People who smoke die earlier than those who do not smoke. The risk of disease increases if you smoke a lot, inhale deeply, or have smoked many years. Why are cigarettes bad for you? Cigarettes are filled with poison that goes into the lungs when you inhale. Coughing, dizziness, and burning of the eyes, nose, and throat are early signs that smoking is harming you. Smoking increases your health risks if you have diabetes, high blood pressure, or high blood cholesterol. The long-term problems of smoking cigarettes are the following:   Cancer: Smoking increases your chances of getting cancer. Cigarette smoking may play a role in developing many kinds of cancer. Lung cancer is the most common kind of cancer caused by smoking. A smoker is at greater risk of getting cancer of the lips, mouth, throat, or voice box. Smokers also have a higher risk of getting esophagus, stomach, kidney, pancreas, cervix, bladder, and skin cancer. Heart and blood vessel disease: If you already have heart or blood vessel problems and smoke, you are at even greater risk of having continued or worse health problems. The nicotine in the tobacco causes an increase in your heart rate and blood pressure. The arteries (blood vessels) in your arms and legs tighten and narrow because of the nicotine in cigarette smoke.  Cigarette smoke increases blood clotting, and may damage the

## 2019-07-02 NOTE — PROGRESS NOTES
Subjective:      Patient ID: Pancho Thomason is a 48 y.o. male. HPI   Chief Complaint   Patient presents with    URI     patient c/o non-productive cough since 6/24/2019; chest congestion, sore throat, wheezing. patient denies drainage, fever. patient has taken OTC Mucinex and NyQuil      Pancho Thomason is a 48 y.o. male with the following history as recorded in [facility]:  Patient Active Problem List    Diagnosis Date Noted    History of pancreatitis 12/20/2018    Achilles tendonitis 05/14/2012     No current outpatient medications on file. No current facility-administered medications for this visit. Allergies: Tramadol  Past Medical History:   Diagnosis Date    Anxiety     High blood pressure      Past Surgical History:   Procedure Laterality Date    CHOLECYSTECTOMY      DENTAL SURGERY  all of his teeth removed    GALLBLADDER SURGERY       Family History   Problem Relation Age of Onset    Diabetes Mother     Cancer Mother         kidney    Heart Attack Mother     High Blood Pressure Mother     Asthma Father     High Blood Pressure Other     Heart Disease Other     Arthritis Other     Alcohol Abuse Brother     Heart Attack Maternal Uncle     Hearing Loss Maternal Grandmother     Heart Disease Maternal Grandmother     Stroke Maternal Grandmother      Social History     Tobacco Use    Smoking status: Current Every Day Smoker     Packs/day: 2.00     Years: 25.00     Pack years: 50.00     Types: Cigarettes    Smokeless tobacco: Never Used   Substance Use Topics    Alcohol use: No     Comment: occasionally        Review of Systems   Constitutional: Negative for chills, diaphoresis, fatigue and fever. HENT: Negative for congestion, postnasal drip and rhinorrhea. Eyes: Negative for visual disturbance. Respiratory: Positive for cough. Negative for apnea and shortness of breath. Cardiovascular: Negative for palpitations.    Gastrointestinal: Negative for abdominal pain and

## 2019-07-05 ENCOUNTER — TELEPHONE (OUTPATIENT)
Dept: FAMILY MEDICINE CLINIC | Age: 54
End: 2019-07-05

## 2019-12-03 ENCOUNTER — HOSPITAL ENCOUNTER (OUTPATIENT)
Dept: GENERAL RADIOLOGY | Age: 54
Discharge: HOME | End: 2019-12-03
Payer: COMMERCIAL

## 2019-12-03 ENCOUNTER — OFFICE VISIT (OUTPATIENT)
Dept: FAMILY MEDICINE CLINIC | Age: 54
End: 2019-12-03
Payer: COMMERCIAL

## 2019-12-03 ENCOUNTER — HOSPITAL ENCOUNTER (OUTPATIENT)
Age: 54
Discharge: HOME | End: 2019-12-03
Payer: COMMERCIAL

## 2019-12-03 VITALS
BODY MASS INDEX: 39.28 KG/M2 | SYSTOLIC BLOOD PRESSURE: 138 MMHG | HEIGHT: 69 IN | DIASTOLIC BLOOD PRESSURE: 82 MMHG | TEMPERATURE: 98.8 F | WEIGHT: 265.2 LBS

## 2019-12-03 DIAGNOSIS — M25.572 ACUTE LEFT ANKLE PAIN: Primary | ICD-10-CM

## 2019-12-03 DIAGNOSIS — M25.572 ACUTE LEFT ANKLE PAIN: ICD-10-CM

## 2019-12-03 PROCEDURE — 99213 OFFICE O/P EST LOW 20 MIN: CPT | Performed by: INTERNAL MEDICINE

## 2019-12-03 PROCEDURE — 73610 X-RAY EXAM OF ANKLE: CPT

## 2019-12-03 RX ORDER — NAPROXEN 500 MG/1
500 TABLET ORAL 2 TIMES DAILY WITH MEALS
Qty: 20 TABLET | Refills: 2 | Status: SHIPPED | OUTPATIENT
Start: 2019-12-03 | End: 2020-01-02 | Stop reason: ALTCHOICE

## 2019-12-03 ASSESSMENT — ENCOUNTER SYMPTOMS
ABDOMINAL PAIN: 0
SHORTNESS OF BREATH: 0
SINUS PRESSURE: 0
RHINORRHEA: 0
APNEA: 0
COUGH: 0
SORE THROAT: 0

## 2020-01-02 ENCOUNTER — OFFICE VISIT (OUTPATIENT)
Dept: FAMILY MEDICINE CLINIC | Age: 55
End: 2020-01-02
Payer: COMMERCIAL

## 2020-01-02 VITALS
BODY MASS INDEX: 39.63 KG/M2 | WEIGHT: 267.6 LBS | HEIGHT: 69 IN | DIASTOLIC BLOOD PRESSURE: 88 MMHG | SYSTOLIC BLOOD PRESSURE: 140 MMHG | TEMPERATURE: 98.7 F

## 2020-01-02 PROCEDURE — 99213 OFFICE O/P EST LOW 20 MIN: CPT | Performed by: FAMILY MEDICINE

## 2020-01-02 RX ORDER — SERTRALINE HYDROCHLORIDE 100 MG/1
100 TABLET, FILM COATED ORAL DAILY
Qty: 30 TABLET | Refills: 0 | Status: SHIPPED | OUTPATIENT
Start: 2020-01-02 | End: 2020-02-03

## 2020-01-02 RX ORDER — LISINOPRIL AND HYDROCHLOROTHIAZIDE 12.5; 1 MG/1; MG/1
1 TABLET ORAL DAILY
Qty: 30 TABLET | Refills: 0 | Status: SHIPPED | OUTPATIENT
Start: 2020-01-02 | End: 2020-02-03

## 2020-01-02 ASSESSMENT — PATIENT HEALTH QUESTIONNAIRE - PHQ9
SUM OF ALL RESPONSES TO PHQ QUESTIONS 1-9: 0
1. LITTLE INTEREST OR PLEASURE IN DOING THINGS: 0
SUM OF ALL RESPONSES TO PHQ QUESTIONS 1-9: 0
2. FEELING DOWN, DEPRESSED OR HOPELESS: 0
SUM OF ALL RESPONSES TO PHQ9 QUESTIONS 1 & 2: 0

## 2020-01-02 NOTE — PROGRESS NOTES
1/2/2020    Az Longoria is a 48 y.o. male    Chief Complaint   Patient presents with    Anxiety     pt was on anxiety meds before not sure on name       SUBJECTIVE  HPI       Az Longoria is a 48 y.o. male presenting today with a chief complaint of being on an antidep and anxiwty med. He has been off meds a year now. He has been more irritable, and he does not want to be around anyone. Sleeping more. Being short with his kids. He ended up going alissa the med because he felt t increased his appetite. Review of Systems   Constitutional: Negative for chills, fatigue and fever. Respiratory: Negative for shortness of breath. Cardiovascular: Negative for chest pain and leg swelling. Psychiatric/Behavioral: Positive for dysphoric mood and sleep disturbance. Negative for self-injury and suicidal ideas. The patient is nervous/anxious. OBJECTIVE  Physical Exam  Vitals signs reviewed. Constitutional:       Appearance: He is well-developed. Neck:      Musculoskeletal: Normal range of motion and neck supple. Thyroid: No thyromegaly. Cardiovascular:      Rate and Rhythm: Normal rate and regular rhythm. Pulmonary:      Effort: Pulmonary effort is normal.      Breath sounds: Normal breath sounds. Musculoskeletal: Normal range of motion. Allergies  Tramadol    Current Outpatient Medications   Medication Sig Dispense Refill    lisinopril-hydrochlorothiazide (PRINZIDE;ZESTORETIC) 10-12.5 MG per tablet Take 1 tablet by mouth daily 30 tablet 0    sertraline (ZOLOFT) 100 MG tablet Take 1 tablet by mouth daily 30 tablet 0     No current facility-administered medications for this visit. Vitals:    01/02/20 1441   BP: (!) 140/88   Temp: 98.7 °F (37.1 °C)      Body mass index is 39.52 kg/m². ASSESSMENT AND PLAN     Diagnosis Orders   1. Anxiety and depression         Pt is stable on current meds. Continue with current meds.   New meds this visit:    Orders

## 2020-01-26 ASSESSMENT — ENCOUNTER SYMPTOMS: SHORTNESS OF BREATH: 0

## 2020-02-03 RX ORDER — LISINOPRIL AND HYDROCHLOROTHIAZIDE 12.5; 1 MG/1; MG/1
TABLET ORAL
Qty: 90 TABLET | Refills: 0 | Status: SHIPPED | OUTPATIENT
Start: 2020-02-03 | End: 2020-08-12 | Stop reason: SDUPTHER

## 2020-02-03 RX ORDER — SERTRALINE HYDROCHLORIDE 100 MG/1
TABLET, FILM COATED ORAL
Qty: 90 TABLET | Refills: 0 | Status: SHIPPED | OUTPATIENT
Start: 2020-02-03

## 2020-03-08 ENCOUNTER — HOSPITAL ENCOUNTER (EMERGENCY)
Age: 55
Discharge: HOME | End: 2020-03-08
Payer: COMMERCIAL

## 2020-03-08 VITALS
WEIGHT: 257.06 LBS | RESPIRATION RATE: 16 BRPM | OXYGEN SATURATION: 96 % | HEART RATE: 80 BPM | HEIGHT: 68 IN | SYSTOLIC BLOOD PRESSURE: 155 MMHG | TEMPERATURE: 98.2 F | DIASTOLIC BLOOD PRESSURE: 87 MMHG | BODY MASS INDEX: 38.96 KG/M2

## 2020-03-08 PROCEDURE — 6360000002 HC RX W HCPCS: Performed by: NURSE PRACTITIONER

## 2020-03-08 PROCEDURE — 99282 EMERGENCY DEPT VISIT SF MDM: CPT

## 2020-03-08 PROCEDURE — 6370000000 HC RX 637 (ALT 250 FOR IP): Performed by: NURSE PRACTITIONER

## 2020-03-08 PROCEDURE — 96372 THER/PROPH/DIAG INJ SC/IM: CPT

## 2020-03-08 RX ORDER — HYDROCODONE BITARTRATE AND ACETAMINOPHEN 5; 325 MG/1; MG/1
1 TABLET ORAL EVERY 6 HOURS PRN
Qty: 12 TABLET | Refills: 0 | Status: SHIPPED | OUTPATIENT
Start: 2020-03-08 | End: 2020-03-11

## 2020-03-08 RX ORDER — LIDOCAINE 4 G/G
1 PATCH TOPICAL DAILY
Status: DISCONTINUED | OUTPATIENT
Start: 2020-03-08 | End: 2020-03-08 | Stop reason: HOSPADM

## 2020-03-08 RX ORDER — ORPHENADRINE CITRATE 30 MG/ML
60 INJECTION INTRAMUSCULAR; INTRAVENOUS ONCE
Status: COMPLETED | OUTPATIENT
Start: 2020-03-08 | End: 2020-03-08

## 2020-03-08 RX ORDER — ORPHENADRINE CITRATE 100 MG/1
100 TABLET, EXTENDED RELEASE ORAL 2 TIMES DAILY
Qty: 20 TABLET | Refills: 0 | Status: SHIPPED | OUTPATIENT
Start: 2020-03-08 | End: 2020-03-18

## 2020-03-08 RX ORDER — DEXAMETHASONE SODIUM PHOSPHATE 4 MG/ML
10 INJECTION, SOLUTION INTRA-ARTICULAR; INTRALESIONAL; INTRAMUSCULAR; INTRAVENOUS; SOFT TISSUE ONCE
Status: COMPLETED | OUTPATIENT
Start: 2020-03-08 | End: 2020-03-08

## 2020-03-08 RX ORDER — METHYLPREDNISOLONE 4 MG/1
TABLET ORAL
Qty: 1 KIT | Refills: 0 | Status: SHIPPED | OUTPATIENT
Start: 2020-03-08 | End: 2020-03-14

## 2020-03-08 RX ORDER — LIDOCAINE 50 MG/G
1 PATCH TOPICAL DAILY
Qty: 30 PATCH | Refills: 0 | Status: SHIPPED | OUTPATIENT
Start: 2020-03-08

## 2020-03-08 RX ORDER — MEPERIDINE HYDROCHLORIDE 25 MG/ML
50 INJECTION INTRAMUSCULAR; INTRAVENOUS; SUBCUTANEOUS ONCE
Status: COMPLETED | OUTPATIENT
Start: 2020-03-08 | End: 2020-03-08

## 2020-03-08 RX ADMIN — MEPERIDINE HYDROCHLORIDE 50 MG: 25 INJECTION INTRAMUSCULAR; INTRAVENOUS; SUBCUTANEOUS at 15:16

## 2020-03-08 RX ADMIN — DEXAMETHASONE SODIUM PHOSPHATE 10 MG: 4 INJECTION, SOLUTION INTRAMUSCULAR; INTRAVENOUS at 15:20

## 2020-03-08 RX ADMIN — ORPHENADRINE CITRATE 60 MG: 30 INJECTION INTRAMUSCULAR; INTRAVENOUS at 15:15

## 2020-03-08 ASSESSMENT — ENCOUNTER SYMPTOMS
BACK PAIN: 1
RESPIRATORY NEGATIVE: 1

## 2020-03-08 ASSESSMENT — PAIN DESCRIPTION - PROGRESSION: CLINICAL_PROGRESSION: NOT CHANGED

## 2020-03-08 ASSESSMENT — PAIN DESCRIPTION - FREQUENCY: FREQUENCY: CONTINUOUS

## 2020-03-08 ASSESSMENT — PAIN - FUNCTIONAL ASSESSMENT: PAIN_FUNCTIONAL_ASSESSMENT: ACTIVITIES ARE NOT PREVENTED

## 2020-03-08 ASSESSMENT — PAIN DESCRIPTION - DESCRIPTORS: DESCRIPTORS: SHARP;STABBING

## 2020-03-08 ASSESSMENT — PAIN DESCRIPTION - PAIN TYPE: TYPE: ACUTE PAIN

## 2020-03-08 ASSESSMENT — PAIN DESCRIPTION - ONSET: ONSET: AWAKENED FROM SLEEP

## 2020-03-08 ASSESSMENT — PAIN SCALES - GENERAL
PAINLEVEL_OUTOF10: 10
PAINLEVEL_OUTOF10: 10

## 2020-03-08 ASSESSMENT — PAIN DESCRIPTION - ORIENTATION: ORIENTATION: LOWER

## 2020-03-08 ASSESSMENT — PAIN DESCRIPTION - LOCATION: LOCATION: BACK

## 2020-03-08 NOTE — ED TRIAGE NOTES
pt presents to ED for back pain. hx of lower back problems. has degenerative disc and arthritis. states in the last month pain has been progressing when he is laying down to the point he screams. Denies recent injury. Pain is better with movement but today pain is not improving. Has been taking Percocet that he left from an arm surgery and tylenol (last at 1130) which takes the edge off. Denies radiation to legs. Denies loss of control of bowel or bladder.  Denies numbness or tingling

## 2020-03-09 NOTE — ED PROVIDER NOTES
andspasms and pain was so bad that he almost fell because of it. Nursing Notes were reviewed and I agree. REVIEW OF SYSTEMS    (2-9 systems for level 4, 10 or more for level 5)     Review of Systems   Constitutional: Positive for activity change. HENT: Negative. Respiratory: Negative. Cardiovascular: Negative. Musculoskeletal: Positive for back pain. Except as noted above the remainder of the review of systems was reviewed and negative.        PAST MEDICAL HISTORY         Diagnosis Date    Anxiety     Arthritis     Back pain     Degenerative lumbar disc     High blood pressure        SURGICAL HISTORY           Procedure Laterality Date    CHOLECYSTECTOMY      DENTAL SURGERY  all of his teeth removed    GALLBLADDER SURGERY         CURRENT MEDICATIONS       Discharge Medication List as of 3/8/2020  4:13 PM      CONTINUE these medications which have NOT CHANGED    Details   lisinopril-hydrochlorothiazide (PRINZIDE;ZESTORETIC) 10-12.5 MG per tablet TAKE ONE TABLET BY MOUTH DAILY, Disp-90 tablet, R-0Normal      sertraline (ZOLOFT) 100 MG tablet TAKE ONE TABLET BY MOUTH DAILY, Disp-90 tablet, R-0Normal             ALLERGIES     Tramadol    FAMILY HISTORY           Problem Relation Age of Onset    Diabetes Mother     Cancer Mother         kidney    Heart Attack Mother     High Blood Pressure Mother     Asthma Father     High Blood Pressure Other     Heart Disease Other     Arthritis Other     Alcohol Abuse Brother     Stroke Brother     Heart Attack Maternal Uncle     Hearing Loss Maternal Grandmother     Heart Disease Maternal Grandmother     Stroke Maternal Grandmother      Family Status   Relation Name Status    Mother  Alive    Father      Other  (Not Specified)    Other  (Not Specified)    Other  (Not Specified)   [facility] Brother  Alive    MUnc  (Not Specified)    MGM      MGF      PGM      PGF          SOCIAL HISTORY present. Mental Status: He is alert and oriented to person, place, and time. GCS: GCS eye subscore is 4. GCS verbal subscore is 5. GCS motor subscore is 6. Cranial Nerves: No cranial nerve deficit. Sensory: No sensory deficit. Motor: No weakness. Coordination: Coordination is intact. Coordination normal.      Gait: Gait normal.      Deep Tendon Reflexes: Reflexes normal.      Reflex Scores:       Patellar reflexes are 2+ on the right side and 2+ on the left side. Achilles reflexes are 2+ on the right side and 2+ on the left side. Comments: Sensation to the torso anterior posterior upper and lower extremities intact x4. Psychiatric:         Mood and Affect: Mood normal.           DIAGNOSTIC RESULTS     RADIOLOGY:   Non-plain film images such as CT, Ultrasound and MRI are read by the radiologist. Plain radiographic images are visualized and preliminarily interpreted by RODNEY Martell CNP with the below findings:        Interpretation per the Radiologist below, if available at the time of this note:    No orders to display       LABS:  Labs Reviewed - No data to display    All other labs were within normal range or not returned as of this dictation. EMERGENCY DEPARTMENT COURSE and DIFFERENTIAL DIAGNOSIS/MDM:   Vitals:    Vitals:    03/08/20 1341 03/08/20 1615   BP: (!) 144/85 (!) 155/87   Pulse: 97 80   Resp: 19 16   Temp: 97.5 °F (36.4 °C) 98.2 °F (36.8 °C)   TempSrc: Temporal Temporal   SpO2: 98% 96%   Weight: 257 lb 0.9 oz (116.6 kg)    Height: 5' 8\" (1.727 m)        MDM    Medications   orphenadrine (NORFLEX) injection 60 mg (60 mg Intramuscular Given 3/8/20 1515)   meperidine (DEMEROL) injection 50 mg (50 mg Intramuscular Given 3/8/20 1516)   Dexamethasone Sodium Phosphate injection 10 mg (10 mg Oral Given 3/8/20 1520)     Patient was seen and evaluated per myself. Dr. Sonali Woods was present available for consultation as needed.     Differential diagnosis: Sciatic nerve impingement, sciatic nerve inflammation, piriformis syndrome, lumbar paraspinous muscle spasm    Clinical exam reveals reproducible SI joint pain and piriformis gluteal muscle pain with right hip flexion, right lower extremity and hip abduction abduction passively and against resistance. No evidence of vascular compromise. No evidence of cauda equina. No evidence of paresthesia or paralysis. No evidence of foot drop or leg lag. No red flags identified at this time. I have a low index of suspicion for epidural abscess or cord impingement. I do not see any findings that would warrant an emergent MRI at this time. Patient will be treated with Norflex, Demerol and dexamethasone here in the emergency department. I feel that I can safely discharge him home on oral medications. He has an appointment with his primary care provider this week and he may require some outpatient physical therapy. I will also provide him spinal referral for follow-up and encouragement to return if he develops any symptoms consistent with cauda equina or perineal sensation loss and/or foot drop or leg lag. At time of discharge I did reevaluate the patient and reported that he felt better now after being medicated than he has felt in a month. He verbally reported to me that he had almost no pain. Plan of care and general instructions were discussed with this patient as well as clinical impression and he verbalized understanding and is discharged home in good condition. This dictation was performed with a verbal recognition program (DRAGON) and it was checked for errors. It is possible that there are still dictated errors within this office note. If so, please bring any errors to my attention for an addendum. All efforts were made to ensure that this office note is accurate. PROCEDURES:  Procedures    FINAL IMPRESSION      1. Sciatica of right side    2.  Spasm of muscle          DISPOSITION/PLAN

## 2020-08-12 RX ORDER — LISINOPRIL AND HYDROCHLOROTHIAZIDE 12.5; 1 MG/1; MG/1
TABLET ORAL
Qty: 90 TABLET | Refills: 0 | Status: SHIPPED | OUTPATIENT
Start: 2020-08-12

## 2020-09-02 ENCOUNTER — OFFICE VISIT (OUTPATIENT)
Dept: ORTHOPEDIC SURGERY | Age: 55
End: 2020-09-02
Payer: COMMERCIAL

## 2020-09-02 VITALS — HEIGHT: 68 IN | WEIGHT: 280 LBS | BODY MASS INDEX: 42.44 KG/M2 | TEMPERATURE: 97.9 F

## 2020-09-02 PROCEDURE — 99214 OFFICE O/P EST MOD 30 MIN: CPT | Performed by: PHYSICIAN ASSISTANT

## 2020-09-02 RX ORDER — ATORVASTATIN CALCIUM 40 MG/1
TABLET, FILM COATED ORAL
COMMUNITY
Start: 2020-08-27

## 2020-09-02 RX ORDER — CYCLOBENZAPRINE HCL 5 MG
TABLET ORAL
COMMUNITY
Start: 2020-08-25

## 2020-09-02 RX ORDER — METHYLPREDNISOLONE 4 MG/1
TABLET ORAL
Qty: 1 KIT | Refills: 0 | Status: SHIPPED | OUTPATIENT
Start: 2020-09-02 | End: 2020-09-08

## 2020-09-02 RX ORDER — NAPROXEN 500 MG/1
500 TABLET ORAL 2 TIMES DAILY PRN
COMMUNITY
Start: 2020-08-24

## 2020-09-02 RX ORDER — METHOCARBAMOL 750 MG/1
750 TABLET, FILM COATED ORAL EVERY 6 HOURS PRN
COMMUNITY
Start: 2020-08-24

## 2020-09-02 NOTE — PROGRESS NOTES
strength. Sensation intact to light touch. Radial pulse strong      X-Ray:  3 view x-rays of the left shoulder from 8/24/20 reviewed and reveal mild AC joint arthritis, otherwise negative x-rays.  2 view xrays of the cervical spine done today reveal degenerative changes    Impression: 50year old male with neck pain with left sided radiculopathy     Plan:   Reviewed exam and imaging findings with patient  Discussed that symptoms seem to be more from neck and shoulder  Medrol Dosepak prescribed to patient's pharmacy  Advised to resume naproxen when he finishes this, as well as to continue using muscle relaxers  Recommended following up with spine team  All questions answered and patient in agreement with treatment plan  He can follow-up if symptoms become more shoulder-like in nature, discussed these with patient

## 2021-11-02 NOTE — PROGRESS NOTES
Giancarlo Bergman   1965, 49 y.o. male   [id number]       Referring Provider: Shelly Lemon MD  Referral Type: In an order in [address] [place] [address]    Reason for Visit: Evaluation of suspected change in hearing, tinnitus, or balance. ADULT AUDIOLOGIC EVALUATION      Giancarlo Bergman is a 49 y.o. male seen today, 11/4/2021 , for an initial audiologic evaluation. Patient was seen by Shelly Lemon MD following today's evaluation. Patient was accompanied by his wife. AUDIOLOGIC AND OTHER PERTINENT MEDICAL HISTORY:      Giancarlo Bergman noted tinnitus, history of recreational noise exposure and family history of hearing loss. Patient reports decreased hearing that is worse in the left ear. He notes this began about a year ago. Patient has constant tinnitus, bilaterally. He notes bilateral otalgia that is intermittent. He also notes a history of noise exposure from shooting guns and from working [facility]. Patient states his maternal grandfather has a history of hearing loss. Giancarlo Bergman denied otalgia, aural fullness, otorrhea, dizziness, imbalance, history of falls, history of head trauma and history of ear surgery. Date: 11/4/2021     IMPRESSIONS:      AD: hearing WNL sloping to Moderately-Severe rising to Mild sloping to Moderately-Sever SNHL, Good WRS, Type A tymp  Reflexes: Present/ Elevated IPSI and Present/ Absent CONTRA    AS: Mild sloping to Moderately-Severe to Moderate to Moderately-Severe SNHL, Poor WRS, Type A tymp  Reflexes: Absent IPSI and Present/Absent CONTRA    Hearing loss consistent with asymmetric Sensorineural hearing loss. Hearing loss significant enough to create hearing difficulty in some listening situations. Discussed hearing loss, tinnitus, hearing aids and NIHL with patient.  Patient to follow medical recommendations per Shelly Lemon MD .    ASSESSMENT AND FINDINGS:     Otoscopy revealed: Clear ear canals bilaterally    RIGHT EAR:  Hearing Sensitivity: Normal hearing sensitivity to Moderately-Severe to Mild to Moderately-Severe Sensorineural hearing loss  Speech Recognition Threshold: 35 dB HL  Word Recognition:Good (84%), based on NU-6 25-word list at 80m dBHL using recorded speech stimuli. Tympanometry: Normal peak pressure and compliance, Type A tympanogram, consistent with normal middle ear function. Acoustic Reflexes: Ipsilateral: Present at normal sensation levels and Present at elevated sensation levels. Contralateral: Present at elevated sensation levels and Absent. LEFT EAR:  Hearing Sensitivity: Mild to Moderately-Severe to Moderate to Moderately-Severe Sensorineural hearing loss  Speech Recognition Threshold: 50 dB HL  Word Recognition:Poor (60%), based on NU-6 25-word list at 85m dBHL using recorded speech stimuli. Tympanometry: Normal peak pressure and compliance, Type A tympanogram, consistent with normal middle ear function. Acoustic Reflexes: Ipsilateral: Absent. Contralateral: Present at normal sensation levels and Absent. Reliability: Good  Transducer: Inserts    See scanned audiogram dated 11/4/2021  for results. PATIENT EDUCATION:     The following items were discussed with the patient:   - Good Communication Strategies  - Hearing Loss and Hearing Aids  - Tinnitus Management Strategies    - Noise-Induced Hearing Loss and use of Hearing Protection Devices (HPDs)     Educational information was shared in the After Visit Summary.                                                 RECOMMENDATIONS:                                                                                                                                                                                                                                                          The following items are recommended based on patient report and results from today's appointment:   - Continue medical follow-up with Stephy Helms MD.   - Retest hearing as medically indicated and/or sooner if a change in hearing is noted. - If desired, schedule a Hearing Aid Evaluation (HAE) appointment to discuss hearing aid options. - Utilize \"Good Communication Strategies\" as discussed to assist in speech understanding with communication partners. - Maintain a sound enriched environment to assist in the management of tinnitus symptoms.  - Use hearing protection devices (HPDs), such as protective ear muffs and ear plugs, when exposed to dangerous sound levels.        Wilman Palacio  Audiologist    Chart CC'd to: Anthony Guillermo MD      Degree of   Hearing Sensitivity dB Range   Within Normal Limits (WNL) 0 - 20   Mild 20 - 40   Moderate 40 - 55   Moderately-Severe 55 - 70   Severe 70 - 90   Profound 90 +

## 2021-11-03 DIAGNOSIS — H91.90 HEARING LOSS, UNSPECIFIED HEARING LOSS TYPE, UNSPECIFIED LATERALITY: Primary | ICD-10-CM

## 2021-11-04 ENCOUNTER — OFFICE VISIT (OUTPATIENT)
Dept: ENT CLINIC | Age: 56
End: 2021-11-04
Payer: COMMERCIAL

## 2021-11-04 ENCOUNTER — PROCEDURE VISIT (OUTPATIENT)
Dept: AUDIOLOGY | Age: 56
End: 2021-11-04
Payer: COMMERCIAL

## 2021-11-04 VITALS — WEIGHT: 220 LBS | BODY MASS INDEX: 33.34 KG/M2 | HEIGHT: 68 IN

## 2021-11-04 DIAGNOSIS — H93.13 TINNITUS OF BOTH EARS: ICD-10-CM

## 2021-11-04 DIAGNOSIS — H90.3 SENSORINEURAL HEARING LOSS (SNHL) OF BOTH EARS: ICD-10-CM

## 2021-11-04 DIAGNOSIS — H91.8X9 ASYMMETRICAL HEARING LOSS: Primary | ICD-10-CM

## 2021-11-04 DIAGNOSIS — H90.3 SENSORINEURAL HEARING LOSS (SNHL) OF BOTH EARS: Primary | ICD-10-CM

## 2021-11-04 DIAGNOSIS — H91.8X3 ASYMMETRICAL HEARING LOSS: ICD-10-CM

## 2021-11-04 DIAGNOSIS — H93.8X3 SENSATION OF FULLNESS IN BOTH EARS: ICD-10-CM

## 2021-11-04 LAB
ANION GAP SERPL CALCULATED.3IONS-SCNC: 14 MMOL/L (ref 3–16)
BUN BLDV-MCNC: 10 MG/DL (ref 7–20)
CALCIUM SERPL-MCNC: 9.7 MG/DL (ref 8.3–10.6)
CHLORIDE BLD-SCNC: 101 MMOL/L (ref 99–110)
CO2: 27 MMOL/L (ref 21–32)
CREAT SERPL-MCNC: 0.8 MG/DL (ref 0.9–1.3)
GFR AFRICAN AMERICAN: >60
GFR NON-AFRICAN AMERICAN: >60
GLUCOSE BLD-MCNC: 79 MG/DL (ref 70–99)
POTASSIUM SERPL-SCNC: 4.9 MMOL/L (ref 3.5–5.1)
SODIUM BLD-SCNC: 142 MMOL/L (ref 136–145)

## 2021-11-04 PROCEDURE — 99204 OFFICE O/P NEW MOD 45 MIN: CPT | Performed by: STUDENT IN AN ORGANIZED HEALTH CARE EDUCATION/TRAINING PROGRAM

## 2021-11-04 PROCEDURE — 92557 COMPREHENSIVE HEARING TEST: CPT | Performed by: AUDIOLOGIST

## 2021-11-04 PROCEDURE — 92550 TYMPANOMETRY & REFLEX THRESH: CPT | Performed by: AUDIOLOGIST

## 2021-11-04 NOTE — Clinical Note
Dr. Payton Louis,    Please see note from this patient's audiogram from today. Please let me know if there is anything further you need.         Wilman Palacio  Audiologist

## 2021-11-04 NOTE — PATIENT INSTRUCTIONS
If you are interested in pursuing hearing aids, here are the next steps:    1) Contact your insurance and ask, Do I have a benefit for hearing aids?    If the answer is no hearing aids will be a 100% out of pocket expense   If the answer is yes, ask Can I use this benefit at [facility] ([facility])?  or Where can I use this benefit?  If [facility] is not in-network for hearing aids, your insurance should be able to provide the appropriate contact information for an in-network provider. 2) Call [facility] ([phone]) to schedule a Hearing Aid Evaluation    This appointment is when we will discuss hearing aid options and decide which device is most appropriate for you. Hearing Loss: Care Instructions  Your Care Instructions      Hearing loss is a sudden or slow decrease in how well you hear. It can range from mild to profound. Permanent hearing loss can occur with aging, and it can happen when you are exposed long-term to loud noise. Examples include listening to loud music, riding motorcycles, or being around other loud machines. Hearing loss can affect your work and home life. It can make you feel lonely or depressed. You may feel that you have lost your independence. But hearing aids and other devices can help you hear better and feel connected to others. Follow-up care is a key part of your treatment and safety. Be sure to make and go to all appointments, and call your doctor if you are having problems. It's also a good idea to know your test results and keep a list of the medicines you take. How can you care for yourself at home? · Avoid loud noises whenever possible. This helps keep your hearing from getting worse. Always wear hearing protection around loud noises. · If appropriate, wear hearing aid(s) as directed. It is recommended that hearing aids are worn during all waking hours to keep your brain active and give it access to the sounds it is missing.       · If you are beginning your process with hearing aid(s), schedule a \"Hearing Aid Evaluation\" with an audiologist to discuss your lifestyle, features of hearing aid technology, and styles of hearing aids available. It is recommended that you contact your insurance company to determine if you have a hearing aid benefit, as this may dictate who you can see for these services. · Have hearing tests as your doctor suggests. They can show whether your hearing has changed. Your hearing aid may need to be adjusted. · Use other assistive devices as needed. These may include:  ? Telephone amplifiers and hearing aids that can connect to a television, stereo, radio, or microphone. ? Devices that use lights or vibrations. These alert you to the doorbell, a ringing telephone, or a baby monitor. ? Television closed-captioning. This shows the words at the bottom of the screen. Most new TVs can do this. ? TTY (text telephone). This lets you type messages back and forth on the telephone instead of talking or listening. These devices are also called TDD. When messages are typed on the keyboard, they are sent over the phone line to a receiving TTY. The message is shown on a monitor. · Use pagers, fax machines, text, and email if it is hard for you to communicate by telephone. · Try to learn a listening technique called speech-reading. It is not lip-reading. You pay attention to people's gestures, expressions, posture, and tone of voice. These clues can help you understand what a person is saying. Face the person you are talking to, and have him or her face you. Make sure the lighting is good. You need to see the other person's face clearly. · Think about counseling if you need help to adjust to your hearing loss. When should you call for help? Watch closely for changes in your health, and be sure to contact your doctor if:    · You think your hearing is getting worse. · You have new symptoms, such as dizziness or nausea. Learning About Hearing Aids  What is a hearing aid? A hearing aid makes sounds louder. It can help some people with hearing problems to hear better. Hearing aids do not restore normal hearing. But they can make it easier to communicate by making sounds clearer. · Digital programmable hearing aids can adjust themselves to work best where you are at any time. You also have more choices in setting them up than with analog hearing aids. There are also different styles of hearing aids. · A behind-the-ear (BTE) hearing aid connects to a plastic ear mold that fits inside the outer ear. BTE hearing aids are used for all levels of hearing loss, especially very severe hearing loss. They may be better for children for safety and growth reasons. Poorly fitting BTE ear molds or a buildup of earwax may cause a whistling sound (feedback). · An in-the-ear (ITE) hearing aid fits in the outer part of the ear. It can be used by people with mild to severe hearing loss. ITE hearing aids can be used with other hearing devices, such as a telecoil that improves hearing during phone calls. ITE hearing aids can be damaged by earwax and fluid draining from the ear. Their small size may be hard for some people to handle. They are not often used in children because the case must be replaced as the child grows. · An in-the-canal (ITC) hearing aid fits into the ear canal. ITC hearing aids are used by people with mild to moderate hearing loss. They are made to fit the shape and the size of your ear canal. They can be damaged by earwax and fluid draining from the ear. Their small size may be hard for some people to handle. They are not made for children. You have some options if you have a hearing problem and are thinking about getting hearing aids. You can go to your doctor or an audiologist. He or she will do a hearing test and help you decide which type and style of hearing aid may be best for you.      What else should I know about hearing aids? Find out if your insurance covers hearing aids. They can be expensive. Different types of hearing aids come with different costs. Also find out about a warranty or return policy in case you are not happy with your hearing aids. Follow-up care is a key part of your treatment and safety. Be sure to make and go to all appointments, and call your doctor if you are having problems. It's also a good idea to know your test results and keep a list of the medicines you take. Where can you learn more? Go to https://chpepiceweb.[facility]. org and sign in to your [employer] account. Enter E984 in the [employer] box to learn more about \"Learning About Hearing Aids. \"     If you do not have an account, please click on the \"Sign Up Now\" link. Current as of: October 21, 2018  Content Version: 12.1  © 1638-5993 [employer], [employer]. Care instructions adapted under license by [facility] ([facility]). If you have questions about a medical condition or this instruction, always ask your healthcare professional. [place] [address] any warranty or liability for your use of this information. Good Communication Strategies    Communication can be challenging for anyone, but can be especially difficult for those with some degree of hearing loss. While we may not be able to control every factor that may lead to difficulty with communication, there are Good Communication Strategies that we can all use in our day-to-day lives. Communication takes both parties working together for it to be successful. Tips as a Listener:   1. Control your environment. It is important to limit the amount of background noise in the room when possible. You should also consider having a good light source in the room to best see the other person. 2. Ask for clarification. Instead of saying \"What?\", you can use parts of what you heard to make a new question.   For example, if you heard the word \"Thursday\" tinkle, or a buzz. The sound usually lasts only a few minutes. If it goes on all the time, you may have tinnitus. Tinnitus is usually caused by long-term exposure to loud noise. This damages the nerves in the inner ear. It can occur with all types of hearing loss. It may be a symptom of almost any ear problem. Tinnitus may be caused by a buildup of earwax. Or, it may be caused by ear infections or certain medicines (especially antibiotics or large amounts of aspirin). You can also hear noises in your ears because of an injury to the ears, drinking too much alcohol or caffeine, or a medical condition. Other conditions may also contribute to tinnitus, including: head and neck trauma, temporomandibular joint disorder (TMJ), sinus pressure and barometric trauma, traumatic brain injury, metabolic disorders, autoimmune disorders, stress, and high blood pressure. You may need tests to evaluate your hearing and to find causes of long-lasting tinnitus. Your doctor may suggest one or more treatments to help you cope with the tinnitus. You can also do things at home to help reduce symptoms. Causes of Tinnitus  We do not know the exact cause of tinnitus. One thing we do know is that you are not imagining it. If you have tinnitus, chances are the cause will remain a mystery. Conditions that might cause tinnitus include the following:    Hearing loss    Ménière's disease    Loud noise exposure    Migraines    Head injury    Drugs or medicines that are toxic to hearing    Anemia    High blood pressure    Stress    A lot of wax in the ear    Certain types of tumors    Having a lot of caffeine    Smoking cigarettes  You may find that your tinnitus is worse at night. This happens because it is quiet and you are not distracted. Feeling tired and stressed may make your tinnitus worse. Follow-up care is a key part of your treatment and safety.  Be sure to make and go to all appointments, and call your doctor if you are having problems. It's also a good idea to know your test results and keep a list of the medicines you take. How can you care for yourself at home? · Limit or cut out alcohol, caffeine, and sodium. They can make your symptoms worse. · Do not smoke or use other tobacco products. Nicotine reduces blood flow to the ear and makes tinnitus worse. If you need help quitting, talk to your doctor about stop-smoking programs and medicines. These can increase your chances of quitting for good. · Talk to your doctor about whether to stop taking aspirin and similar products such as ibuprofen or naproxen. · Get exercise often. It can help improve blood flow to the ear. Hearing Aids and Other Devices  A hearing aid may help your tinnitus if you have a hearing loss. An audiologist can help you find and use the best hearing aid for you. Tinnitus maskers look like hearing aids. They make a sound that masks, or covers up, the tinnitus. The masking sound distracts you from the ringing in your ears. You may be able to use a masker and a hearing aid at the same time. Sound machines can be useful at night or during quiet times. There are machines you can buy at the store. Or, you can find apps on your phone that make sounds, like the ocean or rainfall. Fish tanks, fans, quiet music, and indoor waterfalls can help, as well. Ways to manage/cope with tinnitus  Some tinnitus may last a long time. To manage your tinnitus, try to:  · Avoid noises that you think caused your tinnitus. If you can't avoid loud noises, wear earplugs or earmuffs. · Ignore the sound by paying attention to other things. Keeping your brain busy with other tasks or background noise can help your brain not focus on the tinnitus. · Try to not give the tinnitus an emotional reaction. Do your best to ignore the sound and not let it bother you. Relax using biofeedback, meditation, or yoga.  Feeling stressed and being tired can make tinnitus worse.  · Play music or white noise to help you sleep. Background noise may cover up the noise that you hear in your ears. You can buy a tabletop machine or a device that sits under your pillow to play soothing sounds, like ocean waves. · Smart phones have free apps, such as Whist, Relax Melodies, ReSound Relief, and White Noise Lite. These apps have different types of sounds/noise, some of which you can blend together to find sounds that are most soothing to you. · Hearing aid technology, especially when there is some hearing loss, may help reduce tinnitus symptoms by giving your brain better access to the sounds it is missing. There are some hearing aids with built-in noise generator programs, which may help when amplification alone is not enough. Additional resources may be found through the American Tinnitus Association at www.miguel a.org    When should you call for help? Call 911 anytime you think you may need emergency care. For example, call if:    · You have symptoms of a stroke. These may include:  ? Sudden numbness, tingling, weakness, or loss of movement in your face, arm, or leg, especially on only one side of your body. ? Sudden vision changes. ? Sudden trouble speaking. ? Sudden confusion or trouble understanding simple statements. ? Sudden problems with walking or balance. ? A sudden, severe headache that is different from past headaches. Call your doctor now or seek immediate medical care if:    · You develop other symptoms. These may include hearing loss (or worse hearing loss), balance problems, dizziness, nausea, or vomiting. Watch closely for changes in your health, and be sure to contact your doctor if:    · Your tinnitus moves from both ears to one ear. · Your hearing loss gets worse within 1 day after an ear injury. · Your tinnitus or hearing loss does not get better within 1 week after an ear injury.      · Your tinnitus bothers you enough that you want to take medicines to help you cope with it. If you notice changes in your tinnitus and/or your hearing, it is recommended that you have your hearing tested by your audiologist and to follow-up with your physician that manages your hearing loss (such as your ENT or Primary Care doctor). Tinnitus Apps: The following are tinnitus applications created by hearing aid companies. They play environmental sounds that can help to reduce the perception of tinnitus.  Josef Relax                         Resound Tinnitus Relief        Phonak Tinnitus Balance        Noise-Induced Hearing Loss  What it is, and what you can do to prevent it      Exposure to loud sounds, in an occupational setting or recreational, can cause permanent hearing loss. Sound is measured in decibels (dB). Noise-induced hearing loss is the ONLY type of preventable hearing loss. Hearing loss related to noise exposure can occur at any age. There are small sensory cells, called inner and outer hair cells, within the inner ear (cochlea). These cells process the loudness (intensity) and pitch (frequency) of sound and send the signal to the brain via our auditory nerve (vestibulocochlear nerve, cranial nerve VIII). When these cells are damaged, they can result in permanent hearing loss and/or tinnitus. The hair cells responsible for high frequency sounds, like birds chirping, are most likely to be damaged due to loud sounds. The high frequency sounds are also very important for our clarity and understanding of speech. OCCUPATIONAL NOISE EXPOSURE RECREATIONAL NOISE EXPOSURE   Some jobs may have exposure to loud sounds in the workplace. These jobs may include but are not limited to:  [employer] [facility] [employer]   Construction   Welding   Landscaping   Hairdressing/hairstyling   Musicians  [employer]    ... And more! Many activities outside of work may cause permanent hearing loss.   These activities may include but are not limited to:  Lawnmowers, leaf blowers  [employer] (such as pigs squealing)   Chainsaws and other power tools  [employer] musical instruments and/or singing   Listening to music too loudly - at concerts, through stereo, through ear buds or headphones   Attending sporting events   Attending fireworks shows or using fireworks at home  [employer] of firearms   . .. And more! REDUCE OR PROTECT YOUR EARS FROM NOISE EXPOSURE    To do your best to avoid noise-induced hearing loss, here are some tips:   Limit exposure to loud sounds. 85 dB (decibels) is safe for 8 hours. As sounds are louder, the length of time the sound is safe lessens. These numbers are cumulative across a 24-hour period. (NIOSH and CDC, 2002)  o 85 dB is safe for 8 hours  o 88 dB is safe for 4 hours  o 91 dB is safe for 2 hours  o 94 dB is safe for 1 hour  o 97 dB is safe for 30 minutes  o 100 dB is safe for 15 minutes  o 103 dB is safe for 7.5 minutes  o 106 dB is safe for 3.75 minutes  o 109 dB is safe for LESS THAN 2 minutes  o 112 dB is safe for LESS THAN 1 minute  o 115 dB is safe for ~ 30 seconds  o 130 dB can cause IMMEDIATE hearing loss   If you are unsure if a sound is too loud, consider checking the sound level with a \"sound level meter\". There are apps on smart devices that can measure the loudness of the sound. They are not as accurate as expensive equipment used by scientists, but it will give you a guesstimate of how loud the sound is, and if it may be damaging to your hearing.  If you cannot avoid loud sounds, here are ways to reduce your exposure:  o 1. Wear hearing protection  - Ear plugs and protective ear muffs can be used to reduce the intensity of the sound. The higher the NRR (noise reduction rating), the better reduction of the intensity of the sound   o 2. Turn the volume down  - When listening to music, turn the volume down, especially when wearing ear buds or headphones. A good rule of thumb is to not go beyond the middle setting on your device. If you can't hear someone talking to you from arm's length away, your music may be at a level that it can cause damage. If someone else can hear your music from 3 feet away, it may also be at a level that it can cause damage. o 3. Walk away from the sound  - If you do not have the ability to wear hearing protection or turn down the volume of the sound, you should do your best to move away from the source of the sound. - Sound decreases in intensity as we move further from the source. The sound will decrease by 6 dB for every doubling of distance from the sound source. Exposure to these sounds may cause permanent damage to your hearing.   If you suspect your hearing has changed, it is recommended that you have your hearing tested by your audiologist.

## 2021-11-08 NOTE — PROGRESS NOTES
[id number] [employer] (:  1965) is a 49 y.o. male, here for evaluation of the following chief complaint(s):  Cerumen Impaction      ASSESSMENT/PLAN:  1. Asymmetrical hearing loss  -     MRI IAC POSTERIOR FOSSA W WO CONTRAST; Future  -     Basic Metabolic Panel; Future  2. Sensorineural hearing loss (SNHL) of both ears  3. Tinnitus of both ears  4. Sensation of fullness in both ears      This is a very pleasant 49 y.o. male here today for evaluation of the the above-noted complaints. I dependently reviewed his audiogram with him in detail. Shows evidence of an asymmetric sensorineural hearing loss. We discussed with the patient that the standard of care would be to obtain a screening MRI to evaluate for any retrocochlear pathology such as vestibular schwannoma. I will order a BMP to ensure his kidney function is okay. Assuming his MRI is normal, he is cleared to pursue amplification. SUBJECTIVE/OBJECTIVE:  ERICK    Ghada Bates is here today for evaluation of issues related to his ears. He states that he gets ringing in his ears. Is been going on for many years. He now finds it difficult to converse in daily conversations. He denies any facial numbness or weakness. He is noticing that his hearing is gradually getting worse over time. He did have noise exposure to recreational activities. He gets intermittent ear pain which is really more like pressure. Denies drainage from his ears. Denies vertigo.     Audiogram notes were independently reviewed by me and available below:    AD: hearing WNL sloping to Moderately-Severe rising to Mild sloping to Moderately-Sever SNHL, Good WRS, Type A tymp  Reflexes: Present/ Elevated IPSI and Present/ Absent CONTRA    AS: Mild sloping to Moderately-Severe to Moderate to Moderately-Severe SNHL, Poor WRS, Type A tymp  Reflexes: Absent IPSI and Present/Absent CONTRA      REVIEW OF SYSTEMS  The following systems were reviewed and revealed the following in addition to any already discussed in the HPI:    PHYSICAL EXAM    GENERAL: No acute distress, alert and oriented, no hoarseness, strong voice  EYES: EOMI, Anti-icteric  HENT:   Head: Normocephalic and atraumatic. Face:  Symmetric, facial nerve intact, no sinus tenderness  Right Ear: Normal external ear, normal external auditory canal, intact tympanic membrane with normal mobility and aerated middle ear  Left Ear: Normal external ear, normal external auditory canal, intact tympanic membrane with normal mobility and aerated middle ear  Mouth/Oral Cavity:  normal lips, Uvula is midline, no mucosal lesions, no trismus, normal dentition, normal salivary quality/flow  Oropharynx/Larynx:  normal oropharynx, unremarkable tonsils; patient did not tolerate mirror exam due to excessive gag reflex  Nose:Normal external nasal appearance. Anterior rhinoscopy shows  a deviated septum preventing view posteriorly. Normal appearing turbinates. Normal mucosa   NECK: Normal range of motion, no thyromegaly, trachea is midline, no lymphadenopathy, no neck masses, no crepitus  CHEST: Normal respiratory effort, no retractions, breathing comfortably  SKIN: No rashes, normal appearing skin, no evidence of skin lesions/tumors  Neuro:  cranial nerve II-XII intact; normal gait  Cardio:  no edema        PROCEDURE      This note was generated completely or in part utilizing Dragon dictation speech recognition software. Occasionally, words are mistranscribed and despite editing, the text may contain inaccuracies due to incorrect word recognition. If further clarification is needed please contact the office at [phone]. An electronic signature was used to authenticate this note.     --Anthony Guillermo MD

## 2021-11-09 ENCOUNTER — TELEPHONE (OUTPATIENT)
Dept: ENT CLINIC | Age: 56
End: 2021-11-09

## 2021-11-09 NOTE — TELEPHONE ENCOUNTER
Patient is scheduled for MRI on 11-13 and has a follow up appointment with you on 11-18-21. Please review BMP.

## 2021-11-09 NOTE — TELEPHONE ENCOUNTER
Wife called to schedule f/u appt to discuss MRI and is asking for results of bloodwork Dr Cherelle Gunter ordered; please call

## 2021-11-09 NOTE — TELEPHONE ENCOUNTER
I reviewed his blood work and it was normal.  His kidney function is fine. He is good to proceed with the MRI.

## 2021-11-13 ENCOUNTER — HOSPITAL ENCOUNTER (OUTPATIENT)
Dept: MRI IMAGING | Age: 56
Discharge: HOME | End: 2021-11-13
Payer: COMMERCIAL

## 2021-11-13 DIAGNOSIS — H91.8X9 ASYMMETRICAL HEARING LOSS: ICD-10-CM

## 2021-11-13 PROCEDURE — 6360000004 HC RX CONTRAST MEDICATION: Performed by: STUDENT IN AN ORGANIZED HEALTH CARE EDUCATION/TRAINING PROGRAM

## 2021-11-13 PROCEDURE — 2580000003 HC RX 258: Performed by: STUDENT IN AN ORGANIZED HEALTH CARE EDUCATION/TRAINING PROGRAM

## 2021-11-13 PROCEDURE — 70553 MRI BRAIN STEM W/O & W/DYE: CPT

## 2021-11-13 PROCEDURE — A9577 INJ MULTIHANCE: HCPCS | Performed by: STUDENT IN AN ORGANIZED HEALTH CARE EDUCATION/TRAINING PROGRAM

## 2021-11-13 RX ORDER — SODIUM CHLORIDE 0.9 % (FLUSH) 0.9 %
10 SYRINGE (ML) INJECTION ONCE
Status: COMPLETED | OUTPATIENT
Start: 2021-11-13 | End: 2021-11-13

## 2021-11-13 RX ADMIN — GADOBENATE DIMEGLUMINE 19 ML: 529 INJECTION, SOLUTION INTRAVENOUS at 08:07

## 2021-11-13 RX ADMIN — Medication 10 ML: at 08:07

## 2021-11-18 ENCOUNTER — OFFICE VISIT (OUTPATIENT)
Dept: ENT CLINIC | Age: 56
End: 2021-11-18
Payer: COMMERCIAL

## 2021-11-18 VITALS — HEIGHT: 68 IN | BODY MASS INDEX: 31.98 KG/M2 | WEIGHT: 211 LBS

## 2021-11-18 DIAGNOSIS — H93.13 TINNITUS OF BOTH EARS: ICD-10-CM

## 2021-11-18 DIAGNOSIS — H91.8X9 ASYMMETRICAL HEARING LOSS: Primary | ICD-10-CM

## 2021-11-18 DIAGNOSIS — H90.3 SENSORINEURAL HEARING LOSS (SNHL) OF BOTH EARS: ICD-10-CM

## 2021-11-18 DIAGNOSIS — H93.8X3 SENSATION OF FULLNESS IN BOTH EARS: ICD-10-CM

## 2021-11-18 PROCEDURE — 99214 OFFICE O/P EST MOD 30 MIN: CPT | Performed by: STUDENT IN AN ORGANIZED HEALTH CARE EDUCATION/TRAINING PROGRAM

## 2021-11-18 NOTE — PROGRESS NOTES
sensorineural hearing loss. Overall he is stable. He has not had any change in his hearing. REVIEW OF SYSTEMS  The following systems were reviewed and revealed the following in addition to any already discussed in the HPI:    PHYSICAL EXAM    GENERAL: No acute distress, alert and oriented, no hoarseness, strong voice  EYES: EOMI, Anti-icteric  HENT:   Normal external ears. No facial lesions        PROCEDURE      This note was generated completely or in part utilizing Dragon dictation speech recognition software. Occasionally, words are mistranscribed and despite editing, the text may contain inaccuracies due to incorrect word recognition. If further clarification is needed please contact the office at [phone]. An electronic signature was used to authenticate this note.     --Bayron Haider MD

## 2021-12-02 ENCOUNTER — PROCEDURE VISIT (OUTPATIENT)
Dept: AUDIOLOGY | Age: 56
End: 2021-12-02

## 2021-12-02 DIAGNOSIS — H90.3 SENSORINEURAL HEARING LOSS (SNHL) OF BOTH EARS: Primary | ICD-10-CM

## 2021-12-02 PROCEDURE — 99999 PR OFFICE/OUTPT VISIT,PROCEDURE ONLY: CPT | Performed by: AUDIOLOGIST

## 2021-12-02 NOTE — PROGRESS NOTES
Erik Deleon  [birth year]84 y.o. male   [id number]      HEARING [address] was seen today, 12/2/2021 , for a Hearing Aid Evaluation following audiologic evaluation on 11/4/21. Erik Deleon was accompanied by his wife. Patient has no prior history of hearing aid use. Patient does not have an insurance benefit for hearing aids at [facility] ([facility]). Patient is responsible for 100% of the purchasing price at the time of fitting. Hearing aid benefit worksheet scanned into media tab. DATE: 12/2/2021     PROCEDURES:     SOUNDFIELD TESTING:     Name of test Type of amplification Level of signal Level of noise SNR Loss    QuickSIN None 70dBHL varied 7.0     SNR LOSS DEGREE OF SNR LOSS EXPECTED IMPROVEMENT WITH DIRECTIONAL ROSENDO   0-3 dB Normal May hear better than normals in noise   3-7 dB Mild  May hear almost as well as normals hear in noise   7-15 dB Moderate  Directional microphones help; consider array rosendo   >15 dB Severe Maximum SNR improvement is needed; consider FM system       LIFESTYLE EVALUATION:      Primary Complaint:  Not hearing conversation clearly    Phone Difficulty:   Ear: using airpods   Type: Cell iPhone   Is connectivity important to you?: Yes    Xavi Use: yes     Group Activites: 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 Patient also has 5 children at home. He notes difficulty hearing his 5year old daughter. TV: Patient notes increased volume on TV. After a discussion of evaluation results, discussion of levels of technology and prices, and lifestyle assessment. Erik Deleon has decided to consider the options and contact Audiology when a decision has been made. . Color H0,  power Mx1, M ZAK Potter. Signed medical evaluation waiver and evaluation agreement. Technology to be considered: [place] FARIAS         Patient cost due at time of fitting: $4900.00     No charge for today's appointment. Patient states he will be starting a new insurance plan in January of 2022. Since his current plan does not cover hearing aids, he would like to wait until the new plan is in effect to consider hearing aids. Patient is aware that he will need to confirm benefits and that any unpaid portion of the total cost billed to insurance will be his responsibility. I am comfortable with using today's evaluation in January unless the patient would like to return for a new HAE to discuss other options. PATIENT EDUCATION:      - Verbally reviewed benefits and limitations of devices and available features in hearing aids. - Verbally discussed realistic expectations of hearing aid use     RECOMMENDATIONS:      - Contact Audiology when a decision has been made to pursue hearing aids.         Wilman White  Audiologist      NO CHARGE

## 2022-03-28 ENCOUNTER — HOSPITAL ENCOUNTER (EMERGENCY)
Age: 57
Discharge: HOME | End: 2022-03-28
Payer: COMMERCIAL

## 2022-03-28 VITALS
HEART RATE: 91 BPM | RESPIRATION RATE: 16 BRPM | TEMPERATURE: 98.3 F | DIASTOLIC BLOOD PRESSURE: 74 MMHG | WEIGHT: 218.48 LBS | BODY MASS INDEX: 33.11 KG/M2 | OXYGEN SATURATION: 96 % | HEIGHT: 68 IN | SYSTOLIC BLOOD PRESSURE: 136 MMHG

## 2022-03-28 DIAGNOSIS — L03.316 CELLULITIS, UMBILICAL: Primary | ICD-10-CM

## 2022-03-28 PROCEDURE — 99282 EMERGENCY DEPT VISIT SF MDM: CPT

## 2022-03-28 RX ORDER — MUPIROCIN CALCIUM 20 MG/G
CREAM TOPICAL
Qty: 30 G | Refills: 0 | Status: SHIPPED | OUTPATIENT
Start: 2022-03-28 | End: 2022-04-27

## 2022-03-28 NOTE — ED PROVIDER NOTES
[address] [facility]        Pt Name: Michele Barragan  MRN: [id number]  [place] 1965  Date of evaluation: 3/28/2022  Provider: Tung Hernandez PA-C  PCP: Antoinette Patrick MD  Note Started: 12:02 PM EDT       KAREN. I have evaluated this patient. My supervising physician was available for consultation. SAM, [facility]       Chief Complaint   Patient presents with    Other     pt states belly button is bleeding       HISTORY OF PRESENT ILLNESS   (Location, Timing/Onset, Context/Setting, Quality, Duration, Modifying Factors, Severity, Associated Signs and Symptoms)  Note limiting factors. Chief Complaint: Infection bellybutton. Michele Barragan is a 49 y.o. male who presents with concern of bloody drainage from the umbilicus with occasional foul-smelling for the past several days. Intermittent. Seem to be worse when he walks. Denies any systemic ill complaints such as fevers, chills. No erythema or firmness around the umbilicus. States that his significant other has flushed with some peroxide. Questionable improvement. Nursing Notes were all reviewed and agreed with or any disagreements were addressed in the HPI. REVIEW OF SYSTEMS    (2-9 systems for level 4, 10 or more for level 5)     Review of Systems    Positives and Pertinent negatives as per HPI. Except as noted above in the ROS, all other systems were reviewed and negative.        PAST MEDICAL HISTORY     Past Medical History:   Diagnosis Date    Anxiety     Arthritis     Back pain     Degenerative lumbar disc     High blood pressure          SURGICAL HISTORY     Past Surgical History:   Procedure Laterality Date    CHOLECYSTECTOMY      DENTAL SURGERY  all of his teeth removed   [address]       Discharge Medication List as of 3/28/2022 11:03 AM      CONTINUE these medications which have NOT CHANGED    Details   atorvastatin (LIPITOR) 40 MG tablet Historical Med      naproxen (NAPROSYN) 500 MG tablet Take 500 mg by mouth 2 times daily as neededHistorical Med      methocarbamol (ROBAXIN) 750 MG tablet Take 750 mg by mouth every 6 hours as neededHistorical Med      cyclobenzaprine (FLEXERIL) 5 MG tablet Historical Med      lisinopril-hydroCHLOROthiazide (PRINZIDE;ZESTORETIC) 10-12.5 MG per tablet TAKE ONE TABLET BY MOUTH DAILY, Disp-90 tablet,R-0Normal      lidocaine (LIDODERM) 5 % Place 1 patch onto the skin daily 12 hours on, 12 hours off., Disp-30 patch, R-0Print      sertraline (ZOLOFT) 100 MG tablet TAKE ONE TABLET BY MOUTH DAILY, Disp-90 tablet, R-0Normal               ALLERGIES     Tramadol    FAMILYHISTORY       Family History   Problem Relation Age of Onset    Diabetes Mother     Cancer Mother         kidney    Heart Attack Mother     High Blood Pressure Mother     Asthma Father     High Blood Pressure Other     Heart Disease Other     Arthritis Other     Alcohol Abuse Brother     Stroke Brother     Heart Attack Maternal Uncle     Hearing Loss Maternal Grandmother     Heart Disease Maternal Grandmother     Stroke Maternal Grandmother           SOCIAL HISTORY       Social History     Tobacco Use    Smoking status: Current Every Day Smoker     Packs/day: 2.00     Years: 25.00     Pack years: 50.00     Types: Cigarettes    Smokeless tobacco: Never Used   Vaping Use    Vaping Use: Never used   Substance Use Topics    Alcohol use: No     Comment: occasionally     Drug use: No       SCREENINGS    Aaliyah Coma Scale  Eye Opening: Spontaneous  Best Verbal Response: Oriented  Best Motor Response: Obeys commands  Aaliyah Coma Scale Score: 15        PHYSICAL EXAM    (up to 7 for level 4, 8 or more for level 5)     ED Triage Vitals [03/28/22 1025]   BP Temp Temp Source Pulse Resp SpO2 Height Weight   136/74 98.3 °F (36.8 °C) Oral 91 16 96 % 5' 8\" (1.727 m) 218 lb 7.6 oz (99.1 kg) Physical Exam  Vitals and nursing note reviewed. Constitutional:       Appearance: Normal appearance. He is well-developed. He is obese. HENT:      Head: Normocephalic and atraumatic. Right Ear: External ear normal.      Left Ear: External ear normal.   Eyes:      General: No scleral icterus. Right eye: No discharge. Left eye: No discharge. Conjunctiva/sclera: Conjunctivae normal.   Cardiovascular:      Rate and Rhythm: Normal rate and regular rhythm. Heart sounds: Normal heart sounds. Pulmonary:      Effort: Pulmonary effort is normal.      Breath sounds: Normal breath sounds. Musculoskeletal:         General: Normal range of motion. Cervical back: Normal range of motion and neck supple. Skin:     General: Skin is warm and dry. Comments: Patient's abdominal wall is nonerythematous. No induration around umbilicus. Gentle probing reveals no unusual odor or bleeding. Small exudate noted in the very depth of the umbilicus. Neurological:      General: No focal deficit present. Mental Status: He is alert and oriented to person, place, and time. Mental status is at baseline. Psychiatric:         Mood and Affect: Mood normal.         Behavior: Behavior normal.         Thought Content: Thought content normal.         Judgment: Judgment normal.         DIAGNOSTIC RESULTS   LABS:    Labs Reviewed - No data to display    When ordered only abnormal lab results are displayed. All other labs were within normal range or not returned as of this dictation. EKG: When ordered, EKG's are interpreted by the Emergency Department Physician in the absence of a cardiologist.  Please see their note for interpretation of EKG.     RADIOLOGY:   Non-plain film images such as CT, Ultrasound and MRI are read by the radiologist. Plain radiographic images are visualized and preliminarily interpreted by the ED Provider with the below findings:        Interpretation per the Radiologist below, if available at the time of this note:    No orders to display     No results found. PROCEDURES   Unless otherwise noted below, none     Procedures    CRITICAL CARE TIME       CONSULTS:  None      EMERGENCY DEPARTMENT COURSE and DIFFERENTIAL DIAGNOSIS/MDM:   Vitals:    Vitals:    03/28/22 1025   BP: 136/74   Pulse: 91   Resp: 16   Temp: 98.3 °F (36.8 °C)   TempSrc: Oral   SpO2: 96%   Weight: 218 lb 7.6 oz (99.1 kg)   Height: 5' 8\" (1.727 m)       Patient was given the following medications:  Medications - No data to display        At this time we will diagnose emerging cellulitis umbilicus. Culture not obtained. I did give him a 10 cc syringe for flushing a 50-50 mixture of hydroperoxide and water. He is to do this several times on a twice a day basis. He is then to flush with just plain water following the peroxide/water mixture. He is then to thoroughly dry the umbilicus to the depth. He is then to apply Bactroban ointment twice daily. Oral antibiotics not indicated. Patient did express understanding was diagnosis and the treatment plan    FINAL IMPRESSION      1. Cellulitis, umbilical          DISPOSITION/PLAN   DISPOSITION Decision To Discharge 03/28/2022 10:41:17 AM      PATIENT REFERRED TO:  Ernesto Bhakta, [address] [place] [id number] [phone]    Schedule an appointment as soon as possible for a visit in 3 days      [facility]  [address] [address] [phone]  Go to   If symptoms worsen      DISCHARGE MEDICATIONS:  Discharge Medication List as of 3/28/2022 11:03 AM      START taking these medications    Details   mupirocin (BACTROBAN) 2 % cream Apply topically 3 times daily. , Disp-30 g, R-0, Normal             DISCONTINUED MEDICATIONS:  Discharge Medication List as of 3/28/2022 11:03 AM                 (Please note that portions of this note were completed with a voice recognition program.  Efforts were made to edit the dictations but occasionally words are mis-transcribed. )    Dahiana García PA-C (electronically signed)           Dahiana García PA-C  03/28/22 [id number]

## 2022-06-15 ENCOUNTER — OFFICE VISIT (OUTPATIENT)
Dept: ORTHOPEDIC SURGERY | Age: 57
End: 2022-06-15
Payer: COMMERCIAL

## 2022-06-15 VITALS — HEIGHT: 68 IN | BODY MASS INDEX: 34.1 KG/M2 | WEIGHT: 225 LBS

## 2022-06-15 DIAGNOSIS — M72.2 PLANTAR FASCIITIS OF LEFT FOOT: Primary | ICD-10-CM

## 2022-06-15 DIAGNOSIS — F17.200 CURRENT SMOKER: ICD-10-CM

## 2022-06-15 PROCEDURE — 99203 OFFICE O/P NEW LOW 30 MIN: CPT | Performed by: ORTHOPAEDIC SURGERY

## 2022-06-15 PROCEDURE — 99406 BEHAV CHNG SMOKING 3-10 MIN: CPT | Performed by: ORTHOPAEDIC SURGERY

## 2022-06-15 RX ORDER — NAPROXEN 500 MG/1
500 TABLET ORAL 2 TIMES DAILY WITH MEALS
Qty: 60 TABLET | Refills: 0 | Status: SHIPPED | OUTPATIENT
Start: 2022-06-15 | End: 2022-07-15

## 2022-06-19 PROBLEM — F17.200 CURRENT SMOKER: Status: ACTIVE | Noted: 2022-06-19

## 2022-06-19 PROBLEM — M72.2 PLANTAR FASCIITIS OF LEFT FOOT: Status: ACTIVE | Noted: 2022-06-19

## 2022-06-19 NOTE — PROGRESS NOTES
CHIEF COMPLAINT: Left heel pain/plantar fasciitis. HISTORY:  Mr. Dany Medina 49 y.o.  male presents today for the first visit for evaluation of left heel pain which started Jan 2022. He likes to walk, and that increases his pain. He is complaining of sharp pain 6/10. Pain is increase with standing and wallking. Pain is sharp early in the morning with first few steps, dull achy pain by the end of the day. No radiation and no numbness and tingling sensation. No other complaint. Past Medical History:   Diagnosis Date    Anxiety     Arthritis     Back pain     Degenerative lumbar disc     High blood pressure        Past Surgical History:   Procedure Laterality Date    CHOLECYSTECTOMY      DENTAL SURGERY  all of his teeth removed    GALLBLADDER SURGERY         Social History     Socioeconomic History    Marital status:      Spouse name: Not on file    Number of children: Not on file    Years of education: Not on file    Highest education level: Not on file   Occupational History    Occupation: Dispatcher- Manager   Tobacco Use    Smoking status: Current Every Day Smoker     Packs/day: 2.00     Years: 25.00     Pack years: 50.00     Types: Cigarettes    Smokeless tobacco: Never Used   Vaping Use    Vaping Use: Never used   Substance and Sexual Activity    Alcohol use: No     Comment: occasionally     Drug use: No    Sexual activity: Yes     Partners: Female   Other Topics Concern    Not on file   Social History Narrative    Not on file     Social Determinants of Health     Financial Resource Strain:     Difficulty of Paying Living Expenses: Not on file   Food Insecurity:     Worried About Running Out of Food in the Last Year: Not on file    Karli of Food in the Last Year: Not on file   Transportation Needs:     Lack of Transportation (Medical): Not on file    Lack of Transportation (Non-Medical):  Not on file   Physical Activity:     Days of Exercise per Week: Not on file  Minutes of Exercise per Session: Not on file   Stress:     Feeling of Stress : Not on file   Social Connections:     Frequency of Communication with Friends and Family: Not on file    Frequency of Social Gatherings with Friends and Family: Not on file    Attends [employer] Services: Not on file    Active Member of [address] [place] [address] [employer] or Organizations: Not on file    Attends Club or Organization Meetings: Not on file    Marital Status: Not on file   Intimate Partner Violence:     Fear of Current or Ex-Partner: Not on file    Emotionally Abused: Not on file    Physically Abused: Not on file    Sexually Abused: Not on file   Housing Stability:     Unable to Pay for Housing in the Last Year: Not on file    Number of [place] in the Last Year: Not on file    Unstable Housing in the Last Year: Not on file       Family History   Problem Relation Age of Onset    Diabetes Mother     Cancer Mother         kidney    Heart Attack Mother     High Blood Pressure Mother     Asthma Father     High Blood Pressure Other     Heart Disease Other     Arthritis Other     Alcohol Abuse Brother     Stroke Brother     Heart Attack Maternal Uncle     Hearing Loss Maternal Grandmother     Heart Disease Maternal Grandmother     Stroke Maternal Grandmother        Current Outpatient Medications on File Prior to Visit   Medication Sig Dispense Refill    atorvastatin (LIPITOR) 40 MG tablet  (Patient not taking: Reported on 6/15/2022)      naproxen (NAPROSYN) 500 MG tablet Take 500 mg by mouth 2 times daily as needed (Patient not taking: Reported on 6/15/2022)      methocarbamol (ROBAXIN) 750 MG tablet Take 750 mg by mouth every 6 hours as needed (Patient not taking: Reported on 6/15/2022)      cyclobenzaprine (FLEXERIL) 5 MG tablet  (Patient not taking: Reported on 6/15/2022)      lisinopril-hydroCHLOROthiazide (PRINZIDE;ZESTORETIC) 10-12.5 MG per tablet TAKE ONE TABLET BY MOUTH DAILY (Patient not taking: Reported on 6/15/2022) 90 tablet 0    lidocaine (LIDODERM) 5 % Place 1 patch onto the skin daily 12 hours on, 12 hours off. (Patient not taking: Reported on 6/15/2022) 30 patch 0    sertraline (ZOLOFT) 100 MG tablet TAKE ONE TABLET BY MOUTH DAILY (Patient not taking: Reported on 6/15/2022) 90 tablet 0     No current facility-administered medications on file prior to visit. Pertinent items are noted in HPI  Review of systems reviewed from Patient History Form and available in the patient's chart under the Media tab. No change. PHYSICAL EXAMINATION:  Mr. Jose Doty is a very pleasant 49 y.o.  male who presents today in no acute distress, awake, alert, and oriented. He is well dressed, nourished and  groomed. Patient with normal affect. Height is  5' 8\" (1.727 m), weight is 225 lb (102.1 kg), Body mass index is 34.21 kg/m². Resting respiratory rate is 16. Examination of the gait, showed that the patient walks heel-toe with a non-antalgic gait and no limp. Examination of both ankles showing a good range of motion. He has dorsiflexion to about 5 degrees bilaterally, which increased with knee flexion. He has intact sensation and good pedal pulses. He has good strength in all four planes, including eversion, and has tenderness on deep palpation over the medial calcaneal tubercle, compared to the other side. The ankles are stable to drawer test bilaterally, equally. IMAGING:Xray's were reviewed, 3 views of the left foot taken in office today, and showed no acute fracture. No other abnormality. IMPRESSION: Left plantar fasciitis. PLAN: I discussed with the patient the treatment options. We recommended stretching exercises of the calf as well as stretching of the plantar fascia which was taught to the patient today. He will take NSAIDS Naprosyn. Use silicone heel pad . F/u in 6 weeks, PT if needed. He understands that this may take up to 6 months for the pain to resolve.      The patient smokes cigarettes, and we discussed with the patient the risks of smoking on general health and also on bone and soft tissue healing (delay and non-union), and promised to cut down or stop smoking. Smoking: Educated the patient regarding the hazards of smoking and that it harms their body in many ways. It increases the chance of developing heart disease, lung disease, cancer, and other health problems including poor bone and wound healing. The importance of smoking cessation for optimal bone and wound healing was stressed. This was communicated verbally, 5 Minutes.       An Luong MD

## 2023-09-18 ENCOUNTER — HOSPITAL ENCOUNTER (OUTPATIENT)
Age: 58
Discharge: HOME | End: 2023-09-18
Payer: COMMERCIAL

## 2023-09-18 ENCOUNTER — HOSPITAL ENCOUNTER (OUTPATIENT)
Dept: GENERAL RADIOLOGY | Age: 58
Discharge: HOME | End: 2023-09-18
Payer: COMMERCIAL

## 2023-09-18 DIAGNOSIS — M54.50 LEFT LOW BACK PAIN, UNSPECIFIED CHRONICITY, UNSPECIFIED WHETHER SCIATICA PRESENT: ICD-10-CM

## 2023-09-18 PROCEDURE — 72100 X-RAY EXAM L-S SPINE 2/3 VWS: CPT

## 2023-10-25 ENCOUNTER — HOSPITAL ENCOUNTER (OUTPATIENT)
Dept: CT IMAGING | Age: 58
Discharge: HOME | End: 2023-10-25
Payer: COMMERCIAL

## 2023-10-25 DIAGNOSIS — I10 ESSENTIAL HYPERTENSION, MALIGNANT: ICD-10-CM

## 2023-10-25 DIAGNOSIS — R10.9 STOMACH ACHE: ICD-10-CM

## 2023-10-25 DIAGNOSIS — N20.0 URIC ACID NEPHROLITHIASIS: ICD-10-CM

## 2023-10-25 DIAGNOSIS — R31.0 GROSS HEMATURIA: ICD-10-CM

## 2023-10-25 PROCEDURE — 74178 CT ABD&PLV WO CNTR FLWD CNTR: CPT | Performed by: UROLOGY

## 2023-10-25 PROCEDURE — 6360000004 HC RX CONTRAST MEDICATION: Performed by: EMERGENCY MEDICINE

## 2023-10-25 RX ADMIN — IOPAMIDOL 75 ML: 755 INJECTION, SOLUTION INTRAVENOUS at 17:38

## 2024-08-01 ENCOUNTER — HOSPITAL ENCOUNTER (OUTPATIENT)
Age: 59
Discharge: HOME | End: 2024-08-03
Payer: COMMERCIAL

## 2024-08-01 VITALS — HEIGHT: 68 IN | BODY MASS INDEX: 34.1 KG/M2 | WEIGHT: 225 LBS

## 2024-08-01 DIAGNOSIS — R07.9 CHEST PAIN, UNSPECIFIED TYPE: ICD-10-CM

## 2024-08-01 LAB
ECHO BSA: 2.21 M2
STRESS ANGINA INDEX: 0
STRESS BASELINE DIAS BP: 73 MMHG
STRESS BASELINE HR: 107 BPM
STRESS BASELINE SYS BP: 114 MMHG
STRESS ESTIMATED WORKLOAD: 10.1 METS
STRESS EXERCISE DUR MIN: 9 MIN
STRESS EXERCISE DUR SEC: 0 SEC
STRESS PEAK DIAS BP: 52 MMHG
STRESS PEAK SYS BP: 187 MMHG
STRESS PERCENT HR ACHIEVED: 88 %
STRESS POST PEAK HR: 144 BPM
STRESS RATE PRESSURE PRODUCT: NORMAL BPM*MMHG
STRESS TARGET HR: 163 BPM

## 2024-08-01 PROCEDURE — 93017 CV STRESS TEST TRACING ONLY: CPT

## 2025-01-12 ENCOUNTER — APPOINTMENT (OUTPATIENT)
Dept: GENERAL RADIOLOGY | Age: 60
End: 2025-01-12
Payer: COMMERCIAL

## 2025-01-12 ENCOUNTER — APPOINTMENT (OUTPATIENT)
Dept: CT IMAGING | Age: 60
End: 2025-01-12
Payer: COMMERCIAL

## 2025-01-12 ENCOUNTER — HOSPITAL ENCOUNTER (EMERGENCY)
Age: 60
Discharge: HOME | End: 2025-01-12
Attending: EMERGENCY MEDICINE
Payer: COMMERCIAL

## 2025-01-12 VITALS
HEIGHT: 68 IN | BODY MASS INDEX: 33.21 KG/M2 | TEMPERATURE: 98 F | HEART RATE: 79 BPM | DIASTOLIC BLOOD PRESSURE: 93 MMHG | OXYGEN SATURATION: 96 % | RESPIRATION RATE: 16 BRPM | SYSTOLIC BLOOD PRESSURE: 126 MMHG | WEIGHT: 219.14 LBS

## 2025-01-12 DIAGNOSIS — S43.401A SPRAIN OF RIGHT SHOULDER, UNSPECIFIED SHOULDER SPRAIN TYPE, INITIAL ENCOUNTER: ICD-10-CM
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
## 2025-01-12 DIAGNOSIS — S16.1XXA ACUTE CERVICAL MYOFASCIAL STRAIN, INITIAL ENCOUNTER: Primary | ICD-10-CM

## 2025-01-12 PROCEDURE — 72125 CT NECK SPINE W/O DYE: CPT

## 2025-01-12 PROCEDURE — 71046 X-RAY EXAM CHEST 2 VIEWS: CPT

## 2025-01-12 PROCEDURE — 73030 X-RAY EXAM OF SHOULDER: CPT

## 2025-01-12 PROCEDURE — 99284 EMERGENCY DEPT VISIT MOD MDM: CPT

## 2025-01-12 RX ORDER — LIDOCAINE 50 MG/G
1 PATCH TOPICAL DAILY
Qty: 10 PATCH | Refills: 0 | Status: SHIPPED | OUTPATIENT
Start: 2025-01-12 | End: 2025-01-22

## 2025-01-12 RX ORDER — NAPROXEN 500 MG/1
500 TABLET ORAL 2 TIMES DAILY
Qty: 15 TABLET | Refills: 0 | Status: SHIPPED | OUTPATIENT
Start: 2025-01-12

## 2025-01-12 RX ORDER — METHOCARBAMOL 500 MG/1
500 TABLET, FILM COATED ORAL 4 TIMES DAILY PRN
Qty: 40 TABLET | Refills: 0 | Status: SHIPPED | OUTPATIENT
Start: 2025-01-12 | End: 2025-01-22

## 2025-01-12 RX ORDER — SEMAGLUTIDE 0.68 MG/ML
INJECTION, SOLUTION SUBCUTANEOUS
COMMUNITY
Start: 2024-07-15

## 2025-01-12 ASSESSMENT — PAIN DESCRIPTION - ORIENTATION
ORIENTATION: LEFT
ORIENTATION: LEFT

## 2025-01-12 ASSESSMENT — PAIN SCALES - GENERAL
PAINLEVEL_OUTOF10: 2
PAINLEVEL_OUTOF10: 4

## 2025-01-12 ASSESSMENT — PAIN - FUNCTIONAL ASSESSMENT
PAIN_FUNCTIONAL_ASSESSMENT: 0-10
PAIN_FUNCTIONAL_ASSESSMENT: 0-10

## 2025-01-12 ASSESSMENT — PAIN DESCRIPTION - PAIN TYPE
TYPE: ACUTE PAIN
TYPE: ACUTE PAIN

## 2025-01-12 ASSESSMENT — PAIN DESCRIPTION - DESCRIPTORS
DESCRIPTORS: ACHING
DESCRIPTORS: ACHING

## 2025-01-12 ASSESSMENT — PAIN DESCRIPTION - LOCATION
LOCATION: SHOULDER
LOCATION: SHOULDER

## 2025-01-12 ASSESSMENT — PAIN DESCRIPTION - FREQUENCY
FREQUENCY: CONTINUOUS
FREQUENCY: CONTINUOUS

## 2025-01-12 ASSESSMENT — PAIN DESCRIPTION - ONSET: ONSET: ON-GOING

## 2025-01-12 NOTE — DISCHARGE INSTRUCTIONS
Follow-up with a primary care physician in 1 to 2 days for reexamination.  If condition worsens or new symptoms develop, return immediately to the emergency department.  Use ice to the affected areas.  Avoid heat or heating pads.  No lifting greater than 5 pounds.  Avoid strenuous lifting or heavy physical activity for 1 week.  Do not drive or operate machinery while taking pain medication or muscle relaxants.  May cause drowsiness.

## 2025-01-12 NOTE — ED NOTES
Pt finished his workers comp form. ELI Gr gave pt discharge his discharge instructions. He states, understanding. Pt discharged to home
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
